# Patient Record
Sex: MALE | Race: BLACK OR AFRICAN AMERICAN | NOT HISPANIC OR LATINO | Employment: OTHER | URBAN - METROPOLITAN AREA
[De-identification: names, ages, dates, MRNs, and addresses within clinical notes are randomized per-mention and may not be internally consistent; named-entity substitution may affect disease eponyms.]

---

## 2019-08-29 ENCOUNTER — TELEPHONE (OUTPATIENT)
Dept: FAMILY MEDICINE CLINIC | Facility: CLINIC | Age: 69
End: 2019-08-29

## 2019-08-29 NOTE — TELEPHONE ENCOUNTER
S/w Willa w/ [facility] and let her know that patient needs to be seen before we can fill out paper for knee brace

## 2019-09-04 RX ORDER — CALCITRIOL 0.25 UG/1
0.25 CAPSULE, LIQUID FILLED ORAL EVERY OTHER DAY
Start: 2019-09-04

## 2019-09-04 RX ORDER — TAMSULOSIN HYDROCHLORIDE 0.4 MG/1
1 CAPSULE ORAL DAILY
Qty: 30 CAPSULE
Start: 2019-09-04 | End: 2020-03-17 | Stop reason: SDUPTHER

## 2019-09-04 RX ORDER — LISINOPRIL 40 MG/1
40 TABLET ORAL DAILY
Start: 2019-09-04 | End: 2020-03-18 | Stop reason: SDUPTHER

## 2019-09-04 RX ORDER — EPINEPHRINE 0.3 MG/.3ML
0.3 INJECTION SUBCUTANEOUS ONCE
Qty: 1 EACH | Refills: 0
Start: 2019-09-04 | End: 2019-09-04

## 2019-09-04 RX ORDER — NORTRIPTYLINE HYDROCHLORIDE 50 MG/1
100 CAPSULE ORAL NIGHTLY
Start: 2019-09-04

## 2019-09-04 RX ORDER — HYDRALAZINE HYDROCHLORIDE 100 MG/1
100 TABLET, FILM COATED ORAL 2 TIMES DAILY
Start: 2019-09-04 | End: 2020-05-29 | Stop reason: SDUPTHER

## 2019-09-04 RX ORDER — CARVEDILOL 12.5 MG/1
12.5 TABLET ORAL 2 TIMES DAILY WITH MEALS
Start: 2019-09-04 | End: 2019-12-18 | Stop reason: SDUPTHER

## 2019-09-04 RX ORDER — AMLODIPINE BESYLATE 10 MG/1
10 TABLET ORAL DAILY
Qty: 30 TABLET | Refills: 1
Start: 2019-09-04 | End: 2020-03-17 | Stop reason: SDUPTHER

## 2019-09-04 RX ORDER — PRAVASTATIN SODIUM 20 MG
20 TABLET ORAL DAILY
Start: 2019-09-04 | End: 2020-03-18 | Stop reason: SDUPTHER

## 2019-09-04 RX ORDER — TOPIRAMATE 25 MG/1
50 TABLET ORAL NIGHTLY
Start: 2019-09-04

## 2019-09-24 ENCOUNTER — OFFICE VISIT (OUTPATIENT)
Dept: FAMILY MEDICINE CLINIC | Facility: CLINIC | Age: 69
End: 2019-09-24

## 2019-09-24 VITALS
HEIGHT: 72 IN | DIASTOLIC BLOOD PRESSURE: 57 MMHG | OXYGEN SATURATION: 97 % | TEMPERATURE: 97.5 F | BODY MASS INDEX: 28.58 KG/M2 | SYSTOLIC BLOOD PRESSURE: 102 MMHG | HEART RATE: 52 BPM | WEIGHT: 211 LBS

## 2019-09-24 DIAGNOSIS — Z79.4 TYPE 2 DIABETES MELLITUS WITH DIABETIC POLYNEUROPATHY, WITH LONG-TERM CURRENT USE OF INSULIN (HCC): Primary | ICD-10-CM

## 2019-09-24 DIAGNOSIS — E11.42 TYPE 2 DIABETES MELLITUS WITH DIABETIC POLYNEUROPATHY, WITH LONG-TERM CURRENT USE OF INSULIN (HCC): Primary | ICD-10-CM

## 2019-09-24 DIAGNOSIS — N18.2 CHRONIC KIDNEY DISEASE (CKD) STAGE G2/A1, MILDLY DECREASED GLOMERULAR FILTRATION RATE (GFR) BETWEEN 60-89 ML/MIN/1.73 SQUARE METER AND ALBUMINURIA CREATININE RATIO LESS THAN 30 MG/G: ICD-10-CM

## 2019-09-24 DIAGNOSIS — E55.9 VITAMIN D DEFICIENCY: ICD-10-CM

## 2019-09-24 PROCEDURE — 99214 OFFICE O/P EST MOD 30 MIN: CPT | Performed by: FAMILY MEDICINE

## 2019-09-24 RX ORDER — HALOPERIDOL 5 MG/1
5 TABLET ORAL 2 TIMES DAILY
Refills: 0 | COMMUNITY
Start: 2019-07-18

## 2019-09-24 NOTE — PROGRESS NOTES
Chief Complaint   Patient presents with   • Diabetes   • Hypertension   • Benign Prostatic Hypertrophy   • Hyperlipidemia       Subjective   Krish Castro is a 69 y.o. male.     History of Present Illness   FU DM1 with neuropathy. BS running 150 in AM.  No lows.  On insulin regulalry.  Intolerant of metformin.    F/U HTN.  No orhtostasis.  Doing well with meds.    F/u hyperlipidemia.   No myalgias.    The following portions of the patient's history were reviewed and updated as appropriate: allergies, current medications, past family history, past medical history, past social history, past surgical history and problem list.    Review of Systems   Constitutional: Negative for appetite change and fatigue.   HENT: Negative for nosebleeds and sore throat.    Eyes: Negative for blurred vision and visual disturbance.   Respiratory: Negative for shortness of breath and wheezing.    Cardiovascular: Negative for chest pain and leg swelling.   Gastrointestinal: Negative for abdominal distention and abdominal pain.   Endocrine: Negative for cold intolerance and polyuria.   Genitourinary: Negative for dysuria and hematuria.   Musculoskeletal: Negative for arthralgias and myalgias.   Skin: Negative for color change and rash.   Neurological: Negative for weakness and confusion.   Psychiatric/Behavioral: Negative for agitation and depressed mood.       Patient Active Problem List   Diagnosis   • Seasonal allergic rhinitis due to pollen   • BPH (benign prostatic hyperplasia)   • Constipation   • Hypertension, essential   • Depression   • Diabetic peripheral neuropathy (CMS/HCC)   • Hyperlipidemia   • Type 2 diabetes mellitus with diabetic polyneuropathy, with long-term current use of insulin (CMS/[facility])   • Medicare annual wellness visit, subsequent   • Chronic kidney disease (CKD) stage G2/A1, mildly decreased glomerular filtration rate (GFR) between 60-89 mL/min/1.73 square meter and albuminuria creatinine ratio less than 30 mg/g   •  Thyroid disorder   • Vitamin D deficiency       Allergies   Allergen Reactions   • Hydrocodone Itching and Other (See Comments)     Headaches/trembles         Current Outpatient Medications:   •  amLODIPine (NORVASC) 10 MG tablet, Take 1 tablet by mouth Daily., Disp: 30 tablet, Rfl: 1  •  calcitriol (ROCALTROL) 0.25 MCG capsule, Take 1 capsule by mouth Every Other Day., Disp: , Rfl:   •  carvedilol (COREG) 12.5 MG tablet, Take 1 tablet by mouth 2 (Two) Times a Day With Meals., Disp: , Rfl:   •  haloperidol (HALDOL) 5 MG tablet, Take 5 mg by mouth 2 (Two) Times a Day., Disp: , Rfl: 0  •  hydrALAZINE (APRESOLINE) 100 MG tablet, Take 1 tablet by mouth 2 (Two) Times a Day., Disp: , Rfl:   •  insulin aspart (NOVOLOG FLEXPEN) 100 UNIT/ML solution pen-injector sc pen, Inject 4 Units under the skin into the appropriate area as directed 3 (Three) Times a Day With Meals., Disp: 5 pen, Rfl: 5  •  insulin degludec (TRESIBA FLEXTOUCH) 100 UNIT/ML solution pen-injector injection, Inject 18 Units under the skin into the appropriate area as directed Daily., Disp: , Rfl:   •  linaclotide (LINZESS) 145 MCG capsule capsule, Take 1 capsule by mouth Every Morning Before Breakfast., Disp: 30 capsule, Rfl:   •  lisinopril (PRINIVIL,ZESTRIL) 40 MG tablet, Take 1 tablet by mouth Daily., Disp: , Rfl:   •  nortriptyline (PAMELOR) 50 MG capsule, Take 2 capsules by mouth Every Night., Disp: , Rfl:   •  pravastatin (PRAVACHOL) 20 MG tablet, Take 1 tablet by mouth Daily., Disp: , Rfl:   •  tamsulosin (FLOMAX) 0.4 MG capsule 24 hr capsule, Take 1 capsule by mouth Daily., Disp: 30 capsule, Rfl:   •  glucose blood test strip, Check BS TID before meals, Disp: 300 each, Rfl: 3  •  topiramate (TOPAMAX) 25 MG tablet, Take 2 tablets by mouth Every Night., Disp: , Rfl:     Past Medical History:   Diagnosis Date   • Allergic rhinitis    • BPH (benign prostatic hyperplasia)    • Chronic kidney disease (CKD) stage G2/A1, mildly decreased glomerular  filtration rate (GFR) between 60-89 mL/min/1.73 square meter and albuminuria creatinine ratio less than 30 mg/g    • Constipation    • Constipation, chronic    • Depression    • Diabetes mellitus (CMS/HCC)    • Diabetic peripheral neuropathy (CMS/HCC)    • Former smoker    • High blood pressure    • High cholesterol    • Hyperlipidemia    • Hypertension, essential    • Inflamed skin tag    • Medicare annual wellness visit, subsequent    • Prostate disease    • Rash    • Seasonal allergic rhinitis due to pollen    • Stage 2 chronic kidney disease    • Thyroid disorder    • Tinea cruris    • Type 1 diabetes mellitus (CMS/HCC)    • Vitamin D deficiency        Past Surgical History:   Procedure Laterality Date   • COLONOSCOPY  11/2015    2 polyps   • OTHER SURGICAL HISTORY      Sialolithotomy-Abstracted from [facility]       Family History   Problem Relation Age of Onset   • Hyperlipidemia Mother    • Colon cancer Mother    • Hypertension Mother    • Anxiety disorder Mother    • Heart disease Mother    • Diabetes type I Father    • Colon cancer Brother    • Diabetes type I Brother    • Heart disease Other         In females before age 65, and males before age 55       Social History     Tobacco Use   • Smoking status: Former Smoker     Packs/day: 1.00     Years: 5.00     Pack years: 5.00     Types: Cigarettes   • Smokeless tobacco: Never Used   • Tobacco comment: smoked less than 1 pack per day for less than 5 years   Substance Use Topics   • Alcohol use: Yes     Frequency: Never     Comment: drinks alcohol, 7 or less drinks per week            Objective     Vitals:    09/24/19 1413   BP: 102/57   Pulse: 52   Temp: 97.5 °F (36.4 °C)   SpO2: 97%     Body mass index is 28.62 kg/m².    Physical Exam   Constitutional: He is oriented to person, place, and time. He appears well-developed and well-nourished.   HENT:   Head: Normocephalic and atraumatic.   Right Ear: External ear normal.   Left Ear: External ear normal.   Nose:  Nose normal.   Mouth/Throat: Oropharynx is clear and moist.   Eyes: Conjunctivae and EOM are normal. Pupils are equal, round, and reactive to light.   Neck: Normal range of motion. Neck supple. No tracheal deviation present. No thyromegaly present.   Cardiovascular: Normal rate, regular rhythm and normal heart sounds. Exam reveals no gallop and no friction rub.   No murmur heard.  Pulmonary/Chest: Effort normal and breath sounds normal. No respiratory distress. He exhibits no tenderness.   Abdominal: Soft. Bowel sounds are normal. He exhibits no distension. There is no tenderness.   Musculoskeletal: Normal range of motion. He exhibits no edema or tenderness.   Lymphadenopathy:     He has no cervical adenopathy.   Neurological: He is alert and oriented to person, place, and time. He displays normal reflexes. No cranial nerve deficit or sensory deficit. Coordination normal.   Skin: Skin is warm and dry.   Psychiatric: He has a normal mood and affect. His behavior is normal. Judgment and thought content normal.   Nursing note and vitals reviewed.      No results found for: GLUCOSE, BUN, CREATININE, EGFRIFNONA, EGFRIFAFRI, BCR, K, CO2, CALCIUM, PROTENTOTREF, ALBUMIN, LABIL2, AST, ALT    No results found for: WBC, RBC, HGB, HCT, MCV, MCH, MCHC, RDW, RDWSD, MPV, PLT, NEUTRORELPCT, LYMPHORELPCT, MONORELPCT, EOSRELPCT, BASORELPCT, AUTOIGPER, NEUTROABS, LYMPHSABS, MONOSABS, EOSABS, BASOSABS, AUTOIGNUM, NRBC    No results found for: HGBA1C    No results found for: BSVCVKEC02    No results found for: TSH    No results found for: CHOL  No results found for: TRIG  No results found for: HDL  No results found for: LDL  No results found for: VLDL  No results found for: LDLHDL      Procedures    Assessment/Plan   Problems Addressed this Visit        Digestive    Vitamin D deficiency    Relevant Orders    Vitamin D 1,25 Dihydroxy       Endocrine    Type 2 diabetes mellitus with diabetic polyneuropathy, with long-term current use of  insulin (CMS/[facility]) - Primary    Relevant Medications    insulin aspart (NOVOLOG FLEXPEN) 100 UNIT/ML solution pen-injector sc pen    Other Relevant Orders    Lipid Panel    Comprehensive Metabolic Panel    TSH Rfx On Abnormal To Free T4    Hemoglobin A1c    Vitamin D 1,25 Dihydroxy       Genitourinary    Chronic kidney disease (CKD) stage G2/A1, mildly decreased glomerular filtration rate (GFR) between 60-89 mL/min/1.73 square meter and albuminuria creatinine ratio less than 30 mg/g          Orders Placed This Encounter   Procedures   • Lipid Panel   • Comprehensive Metabolic Panel   • TSH Rfx On Abnormal To Free T4   • Hemoglobin A1c   • Vitamin D 1,25 Dihydroxy       Current Outpatient Medications   Medication Sig Dispense Refill   • amLODIPine (NORVASC) 10 MG tablet Take 1 tablet by mouth Daily. 30 tablet 1   • calcitriol (ROCALTROL) 0.25 MCG capsule Take 1 capsule by mouth Every Other Day.     • carvedilol (COREG) 12.5 MG tablet Take 1 tablet by mouth 2 (Two) Times a Day With Meals.     • haloperidol (HALDOL) 5 MG tablet Take 5 mg by mouth 2 (Two) Times a Day.  0   • hydrALAZINE (APRESOLINE) 100 MG tablet Take 1 tablet by mouth 2 (Two) Times a Day.     • insulin aspart (NOVOLOG FLEXPEN) 100 UNIT/ML solution pen-injector sc pen Inject 4 Units under the skin into the appropriate area as directed 3 (Three) Times a Day With Meals. 5 pen 5   • insulin degludec (TRESIBA FLEXTOUCH) 100 UNIT/ML solution pen-injector injection Inject 18 Units under the skin into the appropriate area as directed Daily.     • linaclotide (LINZESS) 145 MCG capsule capsule Take 1 capsule by mouth Every Morning Before Breakfast. 30 capsule    • lisinopril (PRINIVIL,ZESTRIL) 40 MG tablet Take 1 tablet by mouth Daily.     • nortriptyline (PAMELOR) 50 MG capsule Take 2 capsules by mouth Every Night.     • pravastatin (PRAVACHOL) 20 MG tablet Take 1 tablet by mouth Daily.     • tamsulosin (FLOMAX) 0.4 MG capsule 24 hr capsule Take 1 capsule by  mouth Daily. 30 capsule    • glucose blood test strip Check BS TID before meals 300 each 3   • topiramate (TOPAMAX) 25 MG tablet Take 2 tablets by mouth Every Night.       No current facility-administered medications for this visit.        Krish Castro had no medications administered during this visit.    Return in about 3 months (around 12/24/2019).    There are no Patient Instructions on file for this visit.

## 2019-09-25 LAB
1,25(OH)2D3 SERPL-MCNC: 43 PG/ML (ref 19.9–79.3)
ALBUMIN SERPL-MCNC: 3.9 G/DL (ref 3.5–5.2)
ALBUMIN/GLOB SERPL: 1.6 G/DL
ALP SERPL-CCNC: 107 U/L (ref 39–117)
ALT SERPL-CCNC: 12 U/L (ref 1–41)
AST SERPL-CCNC: 8 U/L (ref 1–40)
BILIRUB SERPL-MCNC: 0.4 MG/DL (ref 0.2–1.2)
BUN SERPL-MCNC: 13 MG/DL (ref 8–23)
BUN/CREAT SERPL: 9.4 (ref 7–25)
CALCIUM SERPL-MCNC: 8.9 MG/DL (ref 8.6–10.5)
CHLORIDE SERPL-SCNC: 105 MMOL/L (ref 98–107)
CHOLEST SERPL-MCNC: 131 MG/DL (ref 0–200)
CO2 SERPL-SCNC: 27.3 MMOL/L (ref 22–29)
CREAT SERPL-MCNC: 1.38 MG/DL (ref 0.76–1.27)
GLOBULIN SER CALC-MCNC: 2.4 GM/DL
GLUCOSE SERPL-MCNC: 79 MG/DL (ref 65–99)
HBA1C MFR BLD: 5.4 % (ref 4.8–5.6)
HDLC SERPL-MCNC: 50 MG/DL (ref 40–60)
LDLC SERPL CALC-MCNC: 64 MG/DL (ref 0–100)
POTASSIUM SERPL-SCNC: 4.6 MMOL/L (ref 3.5–5.2)
PROT SERPL-MCNC: 6.3 G/DL (ref 6–8.5)
SODIUM SERPL-SCNC: 143 MMOL/L (ref 136–145)
TRIGL SERPL-MCNC: 87 MG/DL (ref 0–150)
TSH SERPL DL<=0.005 MIU/L-ACNC: 2.64 UIU/ML (ref 0.27–4.2)
VLDLC SERPL CALC-MCNC: 17.4 MG/DL

## 2019-09-30 ENCOUNTER — TELEPHONE (OUTPATIENT)
Dept: OTHER | Facility: OTHER | Age: 69
End: 2019-09-30

## 2019-09-30 DIAGNOSIS — Z79.4 TYPE 2 DIABETES MELLITUS WITH DIABETIC POLYNEUROPATHY, WITH LONG-TERM CURRENT USE OF INSULIN (HCC): Primary | ICD-10-CM

## 2019-09-30 DIAGNOSIS — E11.42 TYPE 2 DIABETES MELLITUS WITH DIABETIC POLYNEUROPATHY, WITH LONG-TERM CURRENT USE OF INSULIN (HCC): Primary | ICD-10-CM

## 2019-09-30 NOTE — TELEPHONE ENCOUNTER
Patient states that he is needing a refill on his one touch test strips. States that his pharmacy has faxed this information over but they have not got a response. Please advise. Confirmed the pharmacy on file for patient.

## 2019-10-10 ENCOUNTER — TELEPHONE (OUTPATIENT)
Dept: FAMILY MEDICINE CLINIC | Facility: CLINIC | Age: 69
End: 2019-10-10

## 2019-10-10 DIAGNOSIS — Z79.4 TYPE 2 DIABETES MELLITUS WITH DIABETIC POLYNEUROPATHY, WITH LONG-TERM CURRENT USE OF INSULIN (HCC): ICD-10-CM

## 2019-10-10 DIAGNOSIS — E11.42 TYPE 2 DIABETES MELLITUS WITH DIABETIC POLYNEUROPATHY, WITH LONG-TERM CURRENT USE OF INSULIN (HCC): ICD-10-CM

## 2019-10-10 NOTE — TELEPHONE ENCOUNTER
PT CALLED AND REQUESTED A REFILL ON GLUCOSE TEST STRIPS. PT IS COMPLETELY OUT OF THEM.     glucose blood test strip    [id number] - [place], [place] - [address] AT IN THE [place] - [phone]  - [phone] (Phone)  [phone] (Fax)

## 2019-10-11 ENCOUNTER — TELEPHONE (OUTPATIENT)
Dept: FAMILY MEDICINE CLINIC | Facility: CLINIC | Age: 69
End: 2019-10-11

## 2019-10-11 DIAGNOSIS — E11.42 TYPE 2 DIABETES MELLITUS WITH DIABETIC POLYNEUROPATHY, WITH LONG-TERM CURRENT USE OF INSULIN (HCC): ICD-10-CM

## 2019-10-11 DIAGNOSIS — Z79.4 TYPE 2 DIABETES MELLITUS WITH DIABETIC POLYNEUROPATHY, WITH LONG-TERM CURRENT USE OF INSULIN (HCC): ICD-10-CM

## 2019-10-11 NOTE — TELEPHONE ENCOUNTER
Pharmacy needs a diagnosis code written on hard copy before they fill.    Pt is aware of this and will see if they will loan him enough strips till Monday.

## 2019-10-14 NOTE — TELEPHONE ENCOUNTER
Attempted to reach patient and let him know that I have hard copy ready to  at main office, got busy tone. This is ready to  at main office

## 2019-12-18 ENCOUNTER — OFFICE VISIT (OUTPATIENT)
Dept: FAMILY MEDICINE CLINIC | Facility: CLINIC | Age: 69
End: 2019-12-18

## 2019-12-18 VITALS
WEIGHT: 209.9 LBS | HEART RATE: 52 BPM | BODY MASS INDEX: 28.43 KG/M2 | OXYGEN SATURATION: 97 % | DIASTOLIC BLOOD PRESSURE: 66 MMHG | HEIGHT: 72 IN | TEMPERATURE: 97.2 F | SYSTOLIC BLOOD PRESSURE: 117 MMHG

## 2019-12-18 DIAGNOSIS — E78.2 MIXED HYPERLIPIDEMIA: ICD-10-CM

## 2019-12-18 DIAGNOSIS — Z00.00 MEDICARE ANNUAL WELLNESS VISIT, SUBSEQUENT: Primary | ICD-10-CM

## 2019-12-18 DIAGNOSIS — E11.42 TYPE 2 DIABETES MELLITUS WITH DIABETIC POLYNEUROPATHY, WITH LONG-TERM CURRENT USE OF INSULIN (HCC): ICD-10-CM

## 2019-12-18 DIAGNOSIS — Z79.4 TYPE 2 DIABETES MELLITUS WITH DIABETIC POLYNEUROPATHY, WITH LONG-TERM CURRENT USE OF INSULIN (HCC): ICD-10-CM

## 2019-12-18 DIAGNOSIS — I10 HYPERTENSION, ESSENTIAL: ICD-10-CM

## 2019-12-18 PROCEDURE — 99214 OFFICE O/P EST MOD 30 MIN: CPT | Performed by: FAMILY MEDICINE

## 2019-12-18 PROCEDURE — G0439 PPPS, SUBSEQ VISIT: HCPCS | Performed by: FAMILY MEDICINE

## 2019-12-18 RX ORDER — CARVEDILOL 12.5 MG/1
12.5 TABLET ORAL 2 TIMES DAILY WITH MEALS
Qty: 180 TABLET | Refills: 3 | Status: SHIPPED | OUTPATIENT
Start: 2019-12-18 | End: 2020-06-18 | Stop reason: SDUPTHER

## 2019-12-18 NOTE — PROGRESS NOTES
The ABCs of the Annual Wellness Visit  Subsequent Medicare Wellness Visit    Chief Complaint   Patient presents with   • Hypertension   • Diabetes   • congestion x 2 weeks       Subjective   History of Present Illness:  Krish Castro is a 69 y.o. male who presents for a Subsequent Medicare Wellness Visit.  F/U HTN.  Noo rthostasis.  Doing well with meds.    F?u DM2.  Needing to see a retinal specialist for retinopathy.  On insulin regularly.  BS running in - 150 in AM. A1c 5.4.  TSH   C/o trouble with cough and congestion for 2 weeks.  I am better but still with cough.  Ill contacts at times with URI symptoms.    F/U hyperlipidemia.  No myalgias.  LDL 64 9/19.     HEALTH RISK ASSESSMENT    Recent Hospitalizations:  No hospitalization(s) within the last year.    Current Medical Providers:  Patient Care Team:  Dimitris David MD as PCP - General (Family Medicine)    Smoking Status:  Social History     Tobacco Use   Smoking Status Former Smoker   • Packs/day: 1.00   • Years: 5.00   • Pack years: 5.00   • Types: Cigarettes   Smokeless Tobacco Never Used   Tobacco Comment    smoked less than 1 pack per day for less than 5 years       Alcohol Consumption:  Social History     Substance and Sexual Activity   Alcohol Use Yes   • Frequency: Never    Comment: drinks alcohol, 7 or less drinks per week       Depression Screen:   PHQ-2/PHQ-9 Depression Screening 12/18/2019   Little interest or pleasure in doing things 0   Feeling down, depressed, or hopeless 0   Total Score 0       Fall Risk Screen:  TAMIKO Fall Risk Assessment was completed, and patient is at LOW risk for falls.Assessment completed on:12/18/2019    Health Habits and Functional and Cognitive Screening:  Functional & Cognitive Status 12/18/2019   Do you have difficulty preparing food and eating? No   Do you have difficulty bathing yourself, getting dressed or grooming yourself? No   Do you have difficulty using the toilet? No   Do you have difficulty  moving around from place to place? No   Do you have trouble with steps or getting out of a bed or a chair? No   Current Diet Well Balanced Diet   Dental Exam Up to date   Eye Exam Up to date   Exercise (times per week) 2 times per week   Current Exercise Activities Include Stationary Bicycling/Spin Class   Do you need help using the phone?  No   Are you deaf or do you have serious difficulty hearing?  No   Do you need help with transportation? No   Do you need help shopping? No   Do you need help preparing meals?  No   Do you need help with housework?  No   Do you need help with laundry? No   Do you need help taking your medications? No   Do you need help managing money? No   Do you ever drive or ride in a car without wearing a seat belt? No   Have you felt unusual stress, anger or loneliness in the last month? No   Who do you live with? Alone   If you need help, do you have trouble finding someone available to you? No   Have you been bothered in the last four weeks by sexual problems? No   Do you have difficulty concentrating, remembering or making decisions? No         Does the patient have evidence of cognitive impairment? No    Asprin use counseling:Does not need ASA (and currently is not on it)    Age-appropriate Screening Schedule:  Refer to the list below for future screening recommendations based on patient's age, sex and/or medical conditions. Orders for these recommended tests are listed in the plan section. The patient has been provided with a written plan.    Health Maintenance   Topic Date Due   • URINE MICROALBUMIN  1950   • TDAP/TD VACCINES (1 - Tdap) 02/28/1960   • ZOSTER VACCINE (2 of 2) 06/20/2018   • INFLUENZA VACCINE  08/01/2019   • DIABETIC FOOT EXAM  08/29/2019   • DIABETIC EYE EXAM  08/29/2019   • HEMOGLOBIN A1C  03/24/2020   • LIPID PANEL  09/24/2020   • COLONOSCOPY  11/19/2020   • PNEUMOCOCCAL VACCINE (65+ HIGH RISK)  Completed          The following portions of the patient's history  were reviewed and updated as appropriate: allergies, current medications, past family history, past medical history, past social history, past surgical history and problem list.    Outpatient Medications Prior to Visit   Medication Sig Dispense Refill   • amLODIPine (NORVASC) 10 MG tablet Take 1 tablet by mouth Daily. 30 tablet 1   • calcitriol (ROCALTROL) 0.25 MCG capsule Take 1 capsule by mouth Every Other Day.     • glucose blood test strip Check BS TID before meals 300 each 3   • haloperidol (HALDOL) 5 MG tablet Take 5 mg by mouth 2 (Two) Times a Day.  0   • hydrALAZINE (APRESOLINE) 100 MG tablet Take 1 tablet by mouth 2 (Two) Times a Day.     • insulin aspart (NOVOLOG FLEXPEN) 100 UNIT/ML solution pen-injector sc pen Inject 4 Units under the skin into the appropriate area as directed 3 (Three) Times a Day With Meals. 5 pen 5   • insulin degludec (TRESIBA FLEXTOUCH) 100 UNIT/ML solution pen-injector injection Inject 18 Units under the skin into the appropriate area as directed Daily.     • linaclotide (LINZESS) 145 MCG capsule capsule Take 1 capsule by mouth Every Morning Before Breakfast. 30 capsule    • lisinopril (PRINIVIL,ZESTRIL) 40 MG tablet Take 1 tablet by mouth Daily.     • nortriptyline (PAMELOR) 50 MG capsule Take 2 capsules by mouth Every Night.     • pravastatin (PRAVACHOL) 20 MG tablet Take 1 tablet by mouth Daily.     • tamsulosin (FLOMAX) 0.4 MG capsule 24 hr capsule Take 1 capsule by mouth Daily. 30 capsule    • topiramate (TOPAMAX) 25 MG tablet Take 2 tablets by mouth Every Night.     • carvedilol (COREG) 12.5 MG tablet Take 1 tablet by mouth 2 (Two) Times a Day With Meals.       No facility-administered medications prior to visit.        Patient Active Problem List   Diagnosis   • Seasonal allergic rhinitis due to pollen   • BPH (benign prostatic hyperplasia)   • Constipation   • Hypertension, essential   • Depression   • Diabetic peripheral neuropathy (CMS/HCC)   • Hyperlipidemia   • Type 2  "diabetes mellitus with diabetic polyneuropathy, with long-term current use of insulin (CMS/[facility])   • Medicare annual wellness visit, subsequent   • Chronic kidney disease (CKD) stage G2/A1, mildly decreased glomerular filtration rate (GFR) between 60-89 mL/min/1.73 square meter and albuminuria creatinine ratio less than 30 mg/g   • Thyroid disorder   • Vitamin D deficiency       Advanced Care Planning:  Patient does not have an advance directive - information provided to the patient today    Review of Systems   Constitutional: Negative for activity change, appetite change and fatigue.   HENT: Negative for hearing loss and postnasal drip.    Eyes: Negative for discharge and itching.   Respiratory: Negative for cough and shortness of breath.    Cardiovascular: Negative for chest pain and leg swelling.   Gastrointestinal: Negative for abdominal distention and abdominal pain.   Endocrine: Negative for cold intolerance and heat intolerance.   Genitourinary: Negative for difficulty urinating and flank pain.   Musculoskeletal: Negative for arthralgias and myalgias.   Skin: Negative for color change.   Neurological: Negative for dizziness and facial asymmetry.   Hematological: Negative for adenopathy.   Psychiatric/Behavioral: Negative for agitation and confusion.       Compared to one year ago, the patient feels his physical health is the same.  Compared to one year ago, the patient feels his mental health is the same.    Reviewed chart for potential of high risk medication in the elderly: yes  Reviewed chart for potential of harmful drug interactions in the elderly:yes    Objective         Vitals:    12/18/19 1412   BP: 117/66   Pulse: 52   Temp: 97.2 °F (36.2 °C)   SpO2: 97%   Weight: 95.2 kg (209 lb 14.4 oz)   Height: 182.9 cm (72\")       Body mass index is 28.47 kg/m².  Discussed the patient's BMI with him. The BMI is above average; BMI management plan is completed.    Physical Exam   Constitutional: He appears " well-developed and well-nourished.   HENT:   Head: Normocephalic and atraumatic.   Right Ear: External ear normal.   Left Ear: External ear normal.   Nose: Nose normal.   Mouth/Throat: Oropharynx is clear and moist.   Eyes: Pupils are equal, round, and reactive to light. Conjunctivae and EOM are normal. Right eye exhibits no discharge. Left eye exhibits no discharge. No scleral icterus.   Neck: Normal range of motion. Neck supple. No JVD present. No tracheal deviation present. No thyromegaly present.   Cardiovascular: Normal rate, regular rhythm, normal heart sounds and intact distal pulses. Exam reveals no gallop and no friction rub.   No murmur heard.  Pulmonary/Chest: Effort normal and breath sounds normal. No respiratory distress. He has no wheezes. He has no rales. He exhibits no tenderness.   Abdominal: Soft. Bowel sounds are normal. He exhibits no distension and no mass. There is no tenderness. There is no rebound and no guarding. No hernia.   Musculoskeletal: Normal range of motion. He exhibits no edema or tenderness.   Lymphadenopathy:     He has no cervical adenopathy.   Neurological: He displays normal reflexes. No cranial nerve deficit or sensory deficit. He exhibits normal muscle tone. Coordination normal.   Skin: Skin is warm and dry.   Psychiatric: He has a normal mood and affect. His behavior is normal. Judgment and thought content normal.   Nursing note and vitals reviewed.      Lab Results   Component Value Date    GLU 79 09/24/2019    CHLPL 131 09/24/2019    TRIG 87 09/24/2019    HDL 50 09/24/2019    LDL 64 09/24/2019    VLDL 17.4 09/24/2019    HGBA1C 5.40 09/24/2019        Assessment/Plan   Medicare Risks and Personalized Health Plan  CMS Preventative Services Quick Reference  Inactivity/Sedentary    The above risks/problems have been discussed with the patient.  Pertinent information has been shared with the patient in the After Visit Summary.  Follow up plans and orders are seen below in the  Assessment/Plan Section.    Diagnoses and all orders for this visit:    1. Medicare annual wellness visit, subsequent (Primary)    2. Type 2 diabetes mellitus with diabetic polyneuropathy, with long-term current use of insulin (CMS/[facility])  Comments:  Stable.  Continue meds.      3. Mixed hyperlipidemia  Comments:  Stable.  Continue statin.      4. Hypertension, essential  Comments:  Stable.  Continue BP meds.    Orders:  -     carvedilol (COREG) 12.5 MG tablet; Take 1 tablet by mouth 2 (Two) Times a Day With Meals.  Dispense: 180 tablet; Refill: 3      Follow Up:  Return in about 3 months (around 3/18/2020).     An After Visit Summary and PPPS were given to the patient.     Get second shingrix.  i.  C scope next year.

## 2020-01-20 ENCOUNTER — TELEPHONE (OUTPATIENT)
Dept: FAMILY MEDICINE CLINIC | Facility: CLINIC | Age: 70
End: 2020-01-20

## 2020-01-20 NOTE — TELEPHONE ENCOUNTER
Left message for pt to call me back regarding paperwork I received from [facility] for diabetic supplies. Wanted to make sure he actually requested supplies from this facility as the paperwork looks like it could be a scam

## 2020-01-28 ENCOUNTER — TELEPHONE (OUTPATIENT)
Dept: FAMILY MEDICINE CLINIC | Facility: CLINIC | Age: 70
End: 2020-01-28

## 2020-03-04 ENCOUNTER — TELEPHONE (OUTPATIENT)
Dept: FAMILY MEDICINE CLINIC | Facility: CLINIC | Age: 70
End: 2020-03-04

## 2020-03-04 DIAGNOSIS — Z79.4 TYPE 2 DIABETES MELLITUS WITH DIABETIC POLYNEUROPATHY, WITH LONG-TERM CURRENT USE OF INSULIN (HCC): ICD-10-CM

## 2020-03-04 DIAGNOSIS — E11.42 TYPE 2 DIABETES MELLITUS WITH DIABETIC POLYNEUROPATHY, WITH LONG-TERM CURRENT USE OF INSULIN (HCC): ICD-10-CM

## 2020-03-04 NOTE — TELEPHONE ENCOUNTER
Patient in came in the office and needs 90 day refills sent to [facility] for the following. Please call when complete because he is out    OneTouch Ultra 50 Test strips    Novolog FlexPen prefilled syringe u-100    Tresiba Flextouch u-100

## 2020-03-09 DIAGNOSIS — E11.42 TYPE 2 DIABETES MELLITUS WITH DIABETIC POLYNEUROPATHY, WITH LONG-TERM CURRENT USE OF INSULIN (HCC): ICD-10-CM

## 2020-03-09 DIAGNOSIS — Z79.4 TYPE 2 DIABETES MELLITUS WITH DIABETIC POLYNEUROPATHY, WITH LONG-TERM CURRENT USE OF INSULIN (HCC): ICD-10-CM

## 2020-03-17 DIAGNOSIS — N40.0 BENIGN PROSTATIC HYPERPLASIA, UNSPECIFIED WHETHER LOWER URINARY TRACT SYMPTOMS PRESENT: Primary | ICD-10-CM

## 2020-03-17 DIAGNOSIS — I10 HYPERTENSION, ESSENTIAL: Primary | ICD-10-CM

## 2020-03-17 RX ORDER — TAMSULOSIN HYDROCHLORIDE 0.4 MG/1
1 CAPSULE ORAL DAILY
Qty: 90 CAPSULE | Refills: 1 | Status: SHIPPED | OUTPATIENT
Start: 2020-03-17 | End: 2020-09-22

## 2020-03-17 RX ORDER — AMLODIPINE BESYLATE 10 MG/1
10 TABLET ORAL DAILY
Qty: 90 TABLET | Refills: 1 | Status: SHIPPED | OUTPATIENT
Start: 2020-03-17 | End: 2020-09-22

## 2020-03-18 ENCOUNTER — OFFICE VISIT (OUTPATIENT)
Dept: FAMILY MEDICINE CLINIC | Facility: CLINIC | Age: 70
End: 2020-03-18

## 2020-03-18 VITALS
DIASTOLIC BLOOD PRESSURE: 68 MMHG | HEIGHT: 72 IN | BODY MASS INDEX: 28.96 KG/M2 | WEIGHT: 213.8 LBS | OXYGEN SATURATION: 99 % | SYSTOLIC BLOOD PRESSURE: 140 MMHG | HEART RATE: 53 BPM | TEMPERATURE: 97.4 F

## 2020-03-18 DIAGNOSIS — E11.42 TYPE 2 DIABETES MELLITUS WITH DIABETIC POLYNEUROPATHY, WITH LONG-TERM CURRENT USE OF INSULIN (HCC): Primary | ICD-10-CM

## 2020-03-18 DIAGNOSIS — E78.2 MIXED HYPERLIPIDEMIA: ICD-10-CM

## 2020-03-18 DIAGNOSIS — Z79.4 TYPE 2 DIABETES MELLITUS WITH DIABETIC POLYNEUROPATHY, WITH LONG-TERM CURRENT USE OF INSULIN (HCC): Primary | ICD-10-CM

## 2020-03-18 DIAGNOSIS — N40.0 BENIGN PROSTATIC HYPERPLASIA, UNSPECIFIED WHETHER LOWER URINARY TRACT SYMPTOMS PRESENT: ICD-10-CM

## 2020-03-18 DIAGNOSIS — I10 HYPERTENSION, ESSENTIAL: ICD-10-CM

## 2020-03-18 PROCEDURE — 99214 OFFICE O/P EST MOD 30 MIN: CPT | Performed by: FAMILY MEDICINE

## 2020-03-18 RX ORDER — PRAVASTATIN SODIUM 20 MG
20 TABLET ORAL DAILY
Qty: 90 TABLET | Refills: 0
Start: 2020-03-18 | End: 2020-04-22 | Stop reason: SDUPTHER

## 2020-03-18 RX ORDER — LISINOPRIL 40 MG/1
40 TABLET ORAL DAILY
Qty: 90 TABLET | Refills: 0 | Status: SHIPPED | OUTPATIENT
Start: 2020-03-18 | End: 2020-06-24 | Stop reason: SDUPTHER

## 2020-03-18 NOTE — PROGRESS NOTES
Chief Complaint   Patient presents with   • Hypertension   • Diabetes       Subjective   Krish Castro is a 70 y.o. male.     History of Present Illness   F/U HTN.  No orthostasis.  Doing well with meds.  No SE>   F/U DM2.  BS running 160-170 in AM.  On novolog 4 u with meals and 18 units with tresiba.   F/U hyperlipidemia.  No myalgias.  LDL 64 5 months ago. On pravastatin.        The following portions of the patient's history were reviewed and updated as appropriate: allergies, current medications, past family history, past medical history, past social history, past surgical history and problem list.    Review of Systems   Constitutional: Negative for appetite change and fatigue.   HENT: Negative for nosebleeds and sore throat.    Eyes: Negative for blurred vision and visual disturbance.   Respiratory: Negative for shortness of breath and wheezing.    Cardiovascular: Negative for chest pain and leg swelling.   Gastrointestinal: Negative for abdominal distention and abdominal pain.   Endocrine: Negative for cold intolerance and polyuria.   Genitourinary: Negative for dysuria and hematuria.   Musculoskeletal: Negative for arthralgias and myalgias.   Skin: Negative for color change and rash.   Neurological: Negative for weakness and confusion.   Psychiatric/Behavioral: Negative for agitation and depressed mood.       Patient Active Problem List   Diagnosis   • Seasonal allergic rhinitis due to pollen   • BPH (benign prostatic hyperplasia)   • Constipation   • Hypertension, essential   • Depression   • Diabetic peripheral neuropathy (CMS/HCC)   • Hyperlipidemia   • Type 2 diabetes mellitus with diabetic polyneuropathy, with long-term current use of insulin (CMS/[facility])   • Medicare annual wellness visit, subsequent   • Chronic kidney disease (CKD) stage G2/A1, mildly decreased glomerular filtration rate (GFR) between 60-89 mL/min/1.73 square meter and albuminuria creatinine ratio less than 30 mg/g   • Thyroid disorder    • Vitamin D deficiency       Allergies   Allergen Reactions   • Hydrocodone Itching and Other (See Comments)     Headaches/trembles         Current Outpatient Medications:   •  amLODIPine (NORVASC) 10 MG tablet, Take 1 tablet by mouth Daily., Disp: 90 tablet, Rfl: 1  •  calcitriol (ROCALTROL) 0.25 MCG capsule, Take 1 capsule by mouth Every Other Day., Disp: , Rfl:   •  carvedilol (COREG) 12.5 MG tablet, Take 1 tablet by mouth 2 (Two) Times a Day With Meals., Disp: 180 tablet, Rfl: 3  •  glucose blood test strip, Check BS TID before meals, Disp: 300 each, Rfl: 3  •  haloperidol (HALDOL) 5 MG tablet, Take 5 mg by mouth 2 (Two) Times a Day., Disp: , Rfl: 0  •  hydrALAZINE (APRESOLINE) 100 MG tablet, Take 1 tablet by mouth 2 (Two) Times a Day., Disp: , Rfl:   •  insulin aspart (NovoLOG FlexPen) 100 UNIT/ML solution pen-injector sc pen, Inject 4 Units under the skin into the appropriate area as directed 3 (Three) Times a Day With Meals., Disp: 5 pen, Rfl: 5  •  insulin degludec (Tresiba FlexTouch) 100 UNIT/ML solution pen-injector injection, Inject 18 Units under the skin into the appropriate area as directed Daily., Disp: 5 pen, Rfl: 5  •  linaclotide (LINZESS) 145 MCG capsule capsule, Take 1 capsule by mouth Every Morning Before Breakfast., Disp: 30 capsule, Rfl:   •  lisinopril (PRINIVIL,ZESTRIL) 40 MG tablet, Take 1 tablet by mouth Daily., Disp: 90 tablet, Rfl: 0  •  nortriptyline (PAMELOR) 50 MG capsule, Take 2 capsules by mouth Every Night., Disp: , Rfl:   •  pravastatin (Pravachol) 20 MG tablet, Take 1 tablet by mouth Daily., Disp: 90 tablet, Rfl: 0  •  tamsulosin (FLOMAX) 0.4 MG capsule 24 hr capsule, Take 1 capsule by mouth Daily., Disp: 90 capsule, Rfl: 1  •  topiramate (TOPAMAX) 25 MG tablet, Take 2 tablets by mouth Every Night., Disp: , Rfl:     Past Medical History:   Diagnosis Date   • Allergic rhinitis    • BPH (benign prostatic hyperplasia)    • Chronic kidney disease (CKD) stage G2/A1, mildly decreased  glomerular filtration rate (GFR) between 60-89 mL/min/1.73 square meter and albuminuria creatinine ratio less than 30 mg/g    • Constipation    • Constipation, chronic    • Depression    • Diabetes mellitus (CMS/HCC)    • Diabetic peripheral neuropathy (CMS/HCC)    • Former smoker    • High blood pressure    • High cholesterol    • Hyperlipidemia    • Hypertension, essential    • Inflamed skin tag    • Medicare annual wellness visit, subsequent    • Prostate disease    • Rash    • Seasonal allergic rhinitis due to pollen    • Stage 2 chronic kidney disease    • Thyroid disorder    • Tinea cruris    • Type 1 diabetes mellitus (CMS/HCC)    • Vitamin D deficiency        Past Surgical History:   Procedure Laterality Date   • COLONOSCOPY  11/2015    2 polyps   • OTHER SURGICAL HISTORY      Sialolithotomy-Abstracted from [facility]       Family History   Problem Relation Age of Onset   • Hyperlipidemia Mother    • Colon cancer Mother    • Hypertension Mother    • Anxiety disorder Mother    • Heart disease Mother    • Diabetes type I Father    • Colon cancer Brother    • Diabetes type I Brother    • Heart disease Other         In females before age 65, and males before age 55       Social History     Tobacco Use   • Smoking status: Former Smoker     Packs/day: 1.00     Years: 5.00     Pack years: 5.00     Types: Cigarettes   • Smokeless tobacco: Never Used   • Tobacco comment: smoked less than 1 pack per day for less than 5 years   Substance Use Topics   • Alcohol use: Yes     Frequency: Never     Comment: drinks alcohol, 7 or less drinks per week            Objective     Vitals:    03/18/20 1336   BP: 140/68   Pulse: 53   Temp: 97.4 °F (36.3 °C)   SpO2: 99%     Body mass index is 29 kg/m².    Physical Exam   Constitutional: He appears well-developed and well-nourished.   HENT:   Head: Normocephalic and atraumatic.   Mouth/Throat: Oropharynx is clear and moist.   Eyes: Pupils are equal, round, and reactive to light. No  scleral icterus.   Neck: No thyromegaly present.   Cardiovascular: Normal rate and regular rhythm. Exam reveals no gallop and no friction rub.   No murmur heard.  Pulmonary/Chest: Effort normal. No respiratory distress. He has no wheezes. He has no rales. He exhibits no tenderness.   Abdominal: Soft. Bowel sounds are normal. He exhibits no distension. There is no tenderness.   Musculoskeletal: Normal range of motion. He exhibits no edema or deformity.   Lymphadenopathy:     He has no cervical adenopathy.   Neurological: No cranial nerve deficit. He exhibits normal muscle tone.   Skin: Skin is warm and dry. No rash noted. He is not diaphoretic.   Vitals reviewed.      Lab Results   Component Value Date    BUN 13 09/24/2019    CREATININE 1.38 (H) 09/24/2019    EGFRIFNONA 51 (L) 09/24/2019    EGFRIFAFRI 62 09/24/2019    BCR 9.4 09/24/2019    K 4.6 09/24/2019    CO2 27.3 09/24/2019    CALCIUM 8.9 09/24/2019    PROTENTOTREF 6.3 09/24/2019    ALBUMIN 3.90 09/24/2019    LABIL2 1.6 09/24/2019    AST 8 09/24/2019    ALT 12 09/24/2019       No results found for: WBC, RBC, HGB, HCT, MCV, MCH, MCHC, RDW, RDWSD, MPV, PLT, NEUTRORELPCT, LYMPHORELPCT, MONORELPCT, EOSRELPCT, BASORELPCT, AUTOIGPER, NEUTROABS, LYMPHSABS, MONOSABS, EOSABS, BASOSABS, AUTOIGNUM, NRBC    Lab Results   Component Value Date    HGBA1C 5.40 09/24/2019       No results found for: [id number]    TSH   Date Value Ref Range Status   09/24/2019 2.640 0.270 - 4.200 uIU/mL Final       No results found for: CHOL  Lab Results   Component Value Date    TRIG 87 09/24/2019     Lab Results   Component Value Date    HDL 50 09/24/2019     Lab Results   Component Value Date    LDL 64 09/24/2019     Lab Results   Component Value Date    VLDL 17.4 09/24/2019     No results found for: LDLHDL      Procedures    Assessment/Plan   Problems Addressed this Visit        Cardiovascular and Mediastinum    Hypertension, essential    Hyperlipidemia       Endocrine    Type 2 diabetes  mellitus with diabetic polyneuropathy, with long-term current use of insulin (CMS/[facility]) - Primary    Relevant Medications    insulin aspart (NovoLOG FlexPen) 100 UNIT/ML solution pen-injector sc pen    insulin degludec (Tresiba FlexTouch) 100 UNIT/ML solution pen-injector injection    Other Relevant Orders    Comprehensive Metabolic Panel    Hemoglobin A1c       Genitourinary    BPH (benign prostatic hyperplasia)    Relevant Orders    PSA DIAGNOSTIC      HTN.  Controlled.   Continue meds.    LDL at goal.  Continue meds.       Orders Placed This Encounter   Procedures   • Comprehensive Metabolic Panel   • Hemoglobin A1c   • PSA DIAGNOSTIC       Current Outpatient Medications   Medication Sig Dispense Refill   • amLODIPine (NORVASC) 10 MG tablet Take 1 tablet by mouth Daily. 90 tablet 1   • calcitriol (ROCALTROL) 0.25 MCG capsule Take 1 capsule by mouth Every Other Day.     • carvedilol (COREG) 12.5 MG tablet Take 1 tablet by mouth 2 (Two) Times a Day With Meals. 180 tablet 3   • glucose blood test strip Check BS TID before meals 300 each 3   • haloperidol (HALDOL) 5 MG tablet Take 5 mg by mouth 2 (Two) Times a Day.  0   • hydrALAZINE (APRESOLINE) 100 MG tablet Take 1 tablet by mouth 2 (Two) Times a Day.     • insulin aspart (NovoLOG FlexPen) 100 UNIT/ML solution pen-injector sc pen Inject 4 Units under the skin into the appropriate area as directed 3 (Three) Times a Day With Meals. 5 pen 5   • insulin degludec (Tresiba FlexTouch) 100 UNIT/ML solution pen-injector injection Inject 18 Units under the skin into the appropriate area as directed Daily. 5 pen 5   • linaclotide (LINZESS) 145 MCG capsule capsule Take 1 capsule by mouth Every Morning Before Breakfast. 30 capsule    • lisinopril (PRINIVIL,ZESTRIL) 40 MG tablet Take 1 tablet by mouth Daily. 90 tablet 0   • nortriptyline (PAMELOR) 50 MG capsule Take 2 capsules by mouth Every Night.     • pravastatin (Pravachol) 20 MG tablet Take 1 tablet by mouth Daily. 90 tablet  0   • tamsulosin (FLOMAX) 0.4 MG capsule 24 hr capsule Take 1 capsule by mouth Daily. 90 capsule 1   • topiramate (TOPAMAX) 25 MG tablet Take 2 tablets by mouth Every Night.       No current facility-administered medications for this visit.        Krish Castro had no medications administered during this visit.    Return in about 3 months (around 6/18/2020).    There are no Patient Instructions on file for this visit.

## 2020-03-19 LAB
ALBUMIN SERPL-MCNC: 3.9 G/DL (ref 3.5–5.2)
ALBUMIN/GLOB SERPL: 1.6 G/DL
ALP SERPL-CCNC: 114 U/L (ref 39–117)
ALT SERPL-CCNC: 19 U/L (ref 1–41)
AST SERPL-CCNC: 7 U/L (ref 1–40)
BILIRUB SERPL-MCNC: 0.4 MG/DL (ref 0.2–1.2)
BUN SERPL-MCNC: 22 MG/DL (ref 8–23)
BUN/CREAT SERPL: 12.9 (ref 7–25)
CALCIUM SERPL-MCNC: 9.2 MG/DL (ref 8.6–10.5)
CHLORIDE SERPL-SCNC: 102 MMOL/L (ref 98–107)
CO2 SERPL-SCNC: 27.9 MMOL/L (ref 22–29)
CREAT SERPL-MCNC: 1.7 MG/DL (ref 0.76–1.27)
GLOBULIN SER CALC-MCNC: 2.5 GM/DL
GLUCOSE SERPL-MCNC: 118 MG/DL (ref 65–99)
HBA1C MFR BLD: 6 % (ref 4.8–5.6)
POTASSIUM SERPL-SCNC: 4.7 MMOL/L (ref 3.5–5.2)
PROT SERPL-MCNC: 6.4 G/DL (ref 6–8.5)
PSA SERPL-MCNC: 3.16 NG/ML (ref 0–4)
SODIUM SERPL-SCNC: 139 MMOL/L (ref 136–145)

## 2020-04-22 RX ORDER — PRAVASTATIN SODIUM 20 MG
20 TABLET ORAL DAILY
Qty: 90 TABLET | Refills: 1 | Status: SHIPPED | OUTPATIENT
Start: 2020-04-22 | End: 2020-09-24 | Stop reason: SDUPTHER

## 2020-05-29 RX ORDER — HYDRALAZINE HYDROCHLORIDE 100 MG/1
100 TABLET, FILM COATED ORAL 2 TIMES DAILY
Qty: 180 TABLET | Refills: 3 | Status: SHIPPED | OUTPATIENT
Start: 2020-05-29 | End: 2022-05-04 | Stop reason: SDUPTHER

## 2020-06-18 ENCOUNTER — OFFICE VISIT (OUTPATIENT)
Dept: FAMILY MEDICINE CLINIC | Facility: CLINIC | Age: 70
End: 2020-06-18

## 2020-06-18 VITALS
DIASTOLIC BLOOD PRESSURE: 56 MMHG | SYSTOLIC BLOOD PRESSURE: 100 MMHG | HEART RATE: 54 BPM | OXYGEN SATURATION: 95 % | BODY MASS INDEX: 29.15 KG/M2 | HEIGHT: 72 IN | TEMPERATURE: 97.8 F | WEIGHT: 215.2 LBS

## 2020-06-18 DIAGNOSIS — Z79.4 TYPE 2 DIABETES MELLITUS WITH DIABETIC POLYNEUROPATHY, WITH LONG-TERM CURRENT USE OF INSULIN (HCC): Primary | ICD-10-CM

## 2020-06-18 DIAGNOSIS — I10 HYPERTENSION, ESSENTIAL: ICD-10-CM

## 2020-06-18 DIAGNOSIS — E78.2 MIXED HYPERLIPIDEMIA: ICD-10-CM

## 2020-06-18 DIAGNOSIS — E11.42 TYPE 2 DIABETES MELLITUS WITH DIABETIC POLYNEUROPATHY, WITH LONG-TERM CURRENT USE OF INSULIN (HCC): Primary | ICD-10-CM

## 2020-06-18 LAB — HBA1C MFR BLD: 6 %

## 2020-06-18 PROCEDURE — 83036 HEMOGLOBIN GLYCOSYLATED A1C: CPT | Performed by: FAMILY MEDICINE

## 2020-06-18 PROCEDURE — 99214 OFFICE O/P EST MOD 30 MIN: CPT | Performed by: FAMILY MEDICINE

## 2020-06-18 RX ORDER — CARVEDILOL 12.5 MG/1
12.5 TABLET ORAL 2 TIMES DAILY WITH MEALS
Qty: 180 TABLET | Refills: 3 | Status: SHIPPED | OUTPATIENT
Start: 2020-06-18 | End: 2020-12-16

## 2020-06-18 NOTE — PROGRESS NOTES
Chief Complaint   Patient presents with   • Diabetes   • Hypertension   • Hyperlipidemia       Jose Ramon Castro is a 70 y.o. male.     History of Present Illness   F/U DM2.  On tresiba 18 units a day and novolog 4 units with meals. No lows.  BS running 150-175 usually.    F/U HTN.  No orthostasis.   FU hyperlipidemia.  LDL 64 8 months ago.  On statin.       The following portions of the patient's history were reviewed and updated as appropriate: allergies, current medications, past family history, past medical history, past social history, past surgical history and problem list.    Review of Systems   Constitutional: Negative for appetite change and fatigue.   HENT: Negative for nosebleeds and sore throat.    Eyes: Negative for blurred vision and visual disturbance.   Respiratory: Negative for shortness of breath and wheezing.    Cardiovascular: Negative for chest pain and leg swelling.   Gastrointestinal: Negative for abdominal distention and abdominal pain.   Endocrine: Negative for cold intolerance and polyuria.   Genitourinary: Negative for dysuria and hematuria.   Musculoskeletal: Negative for arthralgias and myalgias.   Skin: Negative for color change and rash.   Neurological: Negative for weakness and confusion.   Psychiatric/Behavioral: Negative for agitation and depressed mood.       Patient Active Problem List   Diagnosis   • Seasonal allergic rhinitis due to pollen   • BPH (benign prostatic hyperplasia)   • Constipation   • Hypertension, essential   • Depression   • Diabetic peripheral neuropathy (CMS/[facility])   • Hyperlipidemia   • Type 2 diabetes mellitus with diabetic polyneuropathy, with long-term current use of insulin (CMS/[facility])   • Medicare annual wellness visit, subsequent   • Chronic kidney disease (CKD) stage G2/A1, mildly decreased glomerular filtration rate (GFR) between 60-89 mL/min/1.73 square meter and albuminuria creatinine ratio less than 30 mg/g   • Thyroid disorder   • Vitamin D  deficiency       Allergies   Allergen Reactions   • Hydrocodone Itching and Other (See Comments)     Headaches/trembles         Current Outpatient Medications:   •  amLODIPine (NORVASC) 10 MG tablet, Take 1 tablet by mouth Daily., Disp: 90 tablet, Rfl: 1  •  calcitriol (ROCALTROL) 0.25 MCG capsule, Take 1 capsule by mouth Every Other Day., Disp: , Rfl:   •  carvedilol (Coreg) 12.5 MG tablet, Take 1 tablet by mouth 2 (Two) Times a Day With Meals., Disp: 180 tablet, Rfl: 3  •  glucose blood test strip, Check BS TID before meals, Disp: 300 each, Rfl: 3  •  haloperidol (HALDOL) 5 MG tablet, Take 5 mg by mouth 2 (Two) Times a Day., Disp: , Rfl: 0  •  hydrALAZINE (APRESOLINE) 100 MG tablet, Take 1 tablet by mouth 2 (Two) Times a Day., Disp: 180 tablet, Rfl: 3  •  insulin aspart (NovoLOG FlexPen) 100 UNIT/ML solution pen-injector sc pen, Inject 4 Units under the skin into the appropriate area as directed 3 (Three) Times a Day With Meals., Disp: 5 pen, Rfl: 5  •  insulin degludec (Tresiba FlexTouch) 100 UNIT/ML solution pen-injector injection, Inject 18 Units under the skin into the appropriate area as directed Daily., Disp: 5 pen, Rfl: 5  •  linaclotide (LINZESS) 145 MCG capsule capsule, Take 1 capsule by mouth Every Morning Before Breakfast., Disp: 30 capsule, Rfl:   •  lisinopril (PRINIVIL,ZESTRIL) 40 MG tablet, Take 1 tablet by mouth Daily., Disp: 90 tablet, Rfl: 0  •  nortriptyline (PAMELOR) 50 MG capsule, Take 2 capsules by mouth Every Night., Disp: , Rfl:   •  pravastatin (Pravachol) 20 MG tablet, Take 1 tablet by mouth Daily., Disp: 90 tablet, Rfl: 1  •  tamsulosin (FLOMAX) 0.4 MG capsule 24 hr capsule, Take 1 capsule by mouth Daily., Disp: 90 capsule, Rfl: 1  •  topiramate (TOPAMAX) 25 MG tablet, Take 2 tablets by mouth Every Night., Disp: , Rfl:     Past Medical History:   Diagnosis Date   • Allergic rhinitis    • BPH (benign prostatic hyperplasia)    • Chronic kidney disease (CKD) stage G2/A1, mildly decreased  glomerular filtration rate (GFR) between 60-89 mL/min/1.73 square meter and albuminuria creatinine ratio less than 30 mg/g    • Constipation    • Constipation, chronic    • Depression    • Diabetes mellitus (CMS/HCC)    • Diabetic peripheral neuropathy (CMS/HCC)    • Former smoker    • High blood pressure    • High cholesterol    • Hyperlipidemia    • Hypertension, essential    • Inflamed skin tag    • Medicare annual wellness visit, subsequent    • Prostate disease    • Rash    • Seasonal allergic rhinitis due to pollen    • Stage 2 chronic kidney disease    • Thyroid disorder    • Tinea cruris    • Type 1 diabetes mellitus (CMS/HCC)    • Vitamin D deficiency        Past Surgical History:   Procedure Laterality Date   • COLONOSCOPY  11/2015    2 polyps   • OTHER SURGICAL HISTORY      Sialolithotomy-Abstracted from [facility]       Family History   Problem Relation Age of Onset   • Hyperlipidemia Mother    • Colon cancer Mother    • Hypertension Mother    • Anxiety disorder Mother    • Heart disease Mother    • Diabetes type I Father    • Colon cancer Brother    • Diabetes type I Brother    • Heart disease Other         In females before age 65, and males before age 55       Social History     Tobacco Use   • Smoking status: Former Smoker     Packs/day: 1.00     Years: 5.00     Pack years: 5.00     Types: Cigarettes   • Smokeless tobacco: Never Used   • Tobacco comment: smoked less than 1 pack per day for less than 5 years   Substance Use Topics   • Alcohol use: Yes     Frequency: Never     Comment: drinks alcohol, 7 or less drinks per week            Objective     Vitals:    06/18/20 1330   BP: 100/56   Pulse: 54   Temp: 97.8 °F (36.6 °C)   SpO2: 95%     Body mass index is 29.19 kg/m².    Physical Exam   Constitutional: He appears well-developed and well-nourished.   HENT:   Head: Normocephalic and atraumatic.   Mouth/Throat: Oropharynx is clear and moist.   Eyes: Pupils are equal, round, and reactive to light. No  scleral icterus.   Neck: No thyromegaly present.   Cardiovascular: Normal rate and regular rhythm. Exam reveals no gallop and no friction rub.   No murmur heard.  Pulmonary/Chest: Effort normal. No respiratory distress. He has no wheezes. He has no rales. He exhibits no tenderness.   Abdominal: Soft. Bowel sounds are normal. He exhibits no distension. There is no tenderness.   Musculoskeletal: Normal range of motion. He exhibits no edema or deformity.   Lymphadenopathy:     He has no cervical adenopathy.   Neurological: No cranial nerve deficit. He exhibits normal muscle tone.   Skin: Skin is warm and dry. No rash noted. He is not diaphoretic.   Vitals reviewed.      Lab Results   Component Value Date    BUN 22 03/18/2020    CREATININE 1.70 (H) 03/18/2020    EGFRIFNONA 40 (L) 03/18/2020    EGFRIFAFRI 48 (L) 03/18/2020    BCR 12.9 03/18/2020    K 4.7 03/18/2020    CO2 27.9 03/18/2020    CALCIUM 9.2 03/18/2020    PROTENTOTREF 6.4 03/18/2020    ALBUMIN 3.90 03/18/2020    LABIL2 1.6 03/18/2020    AST 7 03/18/2020    ALT 19 03/18/2020       No results found for: WBC, RBC, HGB, HCT, MCV, MCH, MCHC, RDW, RDWSD, MPV, PLT, NEUTRORELPCT, LYMPHORELPCT, MONORELPCT, EOSRELPCT, BASORELPCT, AUTOIGPER, NEUTROABS, LYMPHSABS, MONOSABS, EOSABS, BASOSABS, AUTOIGNUM, NRBC    Lab Results   Component Value Date    HGBA1C 6.00 (H) 03/18/2020       No results found for: [id number]    TSH   Date Value Ref Range Status   09/24/2019 2.640 0.270 - 4.200 uIU/mL Final       No results found for: CHOL  Lab Results   Component Value Date    TRIG 87 09/24/2019     Lab Results   Component Value Date    HDL 50 09/24/2019     Lab Results   Component Value Date    LDL 64 09/24/2019     Lab Results   Component Value Date    VLDL 17.4 09/24/2019     No results found for: LDLHDL      Procedures    Assessment/Plan   Problems Addressed this Visit        Cardiovascular and Mediastinum    Hypertension, essential    Relevant Medications    carvedilol (Coreg)  12.5 MG tablet    Hyperlipidemia       Endocrine    Type 2 diabetes mellitus with diabetic polyneuropathy, with long-term current use of insulin (CMS/[facility]) - Primary    Relevant Orders    POC Glycosylated Hemoglobin (Hb A1C)      Continue statin.   Continue insulin tx.    Novolog samples given.     Orders Placed This Encounter   Procedures   • POC Glycosylated Hemoglobin (Hb A1C)       Current Outpatient Medications   Medication Sig Dispense Refill   • amLODIPine (NORVASC) 10 MG tablet Take 1 tablet by mouth Daily. 90 tablet 1   • calcitriol (ROCALTROL) 0.25 MCG capsule Take 1 capsule by mouth Every Other Day.     • carvedilol (Coreg) 12.5 MG tablet Take 1 tablet by mouth 2 (Two) Times a Day With Meals. 180 tablet 3   • glucose blood test strip Check BS TID before meals 300 each 3   • haloperidol (HALDOL) 5 MG tablet Take 5 mg by mouth 2 (Two) Times a Day.  0   • hydrALAZINE (APRESOLINE) 100 MG tablet Take 1 tablet by mouth 2 (Two) Times a Day. 180 tablet 3   • insulin aspart (NovoLOG FlexPen) 100 UNIT/ML solution pen-injector sc pen Inject 4 Units under the skin into the appropriate area as directed 3 (Three) Times a Day With Meals. 5 pen 5   • insulin degludec (Tresiba FlexTouch) 100 UNIT/ML solution pen-injector injection Inject 18 Units under the skin into the appropriate area as directed Daily. 5 pen 5   • linaclotide (LINZESS) 145 MCG capsule capsule Take 1 capsule by mouth Every Morning Before Breakfast. 30 capsule    • lisinopril (PRINIVIL,ZESTRIL) 40 MG tablet Take 1 tablet by mouth Daily. 90 tablet 0   • nortriptyline (PAMELOR) 50 MG capsule Take 2 capsules by mouth Every Night.     • pravastatin (Pravachol) 20 MG tablet Take 1 tablet by mouth Daily. 90 tablet 1   • tamsulosin (FLOMAX) 0.4 MG capsule 24 hr capsule Take 1 capsule by mouth Daily. 90 capsule 1   • topiramate (TOPAMAX) 25 MG tablet Take 2 tablets by mouth Every Night.       No current facility-administered medications for this visit.         Krish Castro had no medications administered during this visit.    No follow-ups on file.    There are no Patient Instructions on file for this visit.

## 2020-06-24 ENCOUNTER — TELEPHONE (OUTPATIENT)
Dept: FAMILY MEDICINE CLINIC | Facility: CLINIC | Age: 70
End: 2020-06-24

## 2020-06-24 DIAGNOSIS — I10 HYPERTENSION, ESSENTIAL: Primary | ICD-10-CM

## 2020-06-24 RX ORDER — LISINOPRIL 40 MG/1
40 TABLET ORAL DAILY
Qty: 90 TABLET | Refills: 2 | Status: SHIPPED | OUTPATIENT
Start: 2020-06-24 | End: 2020-12-16 | Stop reason: SDUPTHER

## 2020-06-24 NOTE — TELEPHONE ENCOUNTER
Patient needs the below medication filled      lisinopril (PRINIVIL,ZESTRIL) 40 MG tablet [[id number]]     Order Details   Dose: 40 mg Route: Oral Frequency: Daily   Dispense Quantity: 90 tablet Refills: 0 Fills remaining: --           Sig: Take 1 tablet by mouth Daily.          Written Date: 03/18/20 Expiration Date: 03/18/21     Start Date: 03/18/20 End Date: --

## 2020-09-22 DIAGNOSIS — I10 HYPERTENSION, ESSENTIAL: ICD-10-CM

## 2020-09-22 DIAGNOSIS — N40.0 BENIGN PROSTATIC HYPERPLASIA, UNSPECIFIED WHETHER LOWER URINARY TRACT SYMPTOMS PRESENT: ICD-10-CM

## 2020-09-22 RX ORDER — TAMSULOSIN HYDROCHLORIDE 0.4 MG/1
CAPSULE ORAL
Qty: 90 CAPSULE | Refills: 1 | Status: SHIPPED | OUTPATIENT
Start: 2020-09-22 | End: 2021-03-16

## 2020-09-22 RX ORDER — AMLODIPINE BESYLATE 10 MG/1
TABLET ORAL
Qty: 90 TABLET | Refills: 1 | Status: SHIPPED | OUTPATIENT
Start: 2020-09-22 | End: 2021-03-16

## 2020-09-24 ENCOUNTER — OFFICE VISIT (OUTPATIENT)
Dept: FAMILY MEDICINE CLINIC | Facility: CLINIC | Age: 70
End: 2020-09-24

## 2020-09-24 VITALS
DIASTOLIC BLOOD PRESSURE: 70 MMHG | RESPIRATION RATE: 18 BRPM | BODY MASS INDEX: 31.37 KG/M2 | OXYGEN SATURATION: 98 % | HEART RATE: 52 BPM | HEIGHT: 72 IN | TEMPERATURE: 98 F | SYSTOLIC BLOOD PRESSURE: 138 MMHG | WEIGHT: 231.6 LBS

## 2020-09-24 DIAGNOSIS — Z79.4 TYPE 2 DIABETES MELLITUS WITH DIABETIC POLYNEUROPATHY, WITH LONG-TERM CURRENT USE OF INSULIN (HCC): ICD-10-CM

## 2020-09-24 DIAGNOSIS — Z23 NEED FOR INFLUENZA VACCINATION: Primary | ICD-10-CM

## 2020-09-24 DIAGNOSIS — E78.2 MIXED HYPERLIPIDEMIA: ICD-10-CM

## 2020-09-24 DIAGNOSIS — I10 HYPERTENSION, ESSENTIAL: ICD-10-CM

## 2020-09-24 DIAGNOSIS — N18.2 CHRONIC KIDNEY DISEASE (CKD) STAGE G2/A1, MILDLY DECREASED GLOMERULAR FILTRATION RATE (GFR) BETWEEN 60-89 ML/MIN/1.73 SQUARE METER AND ALBUMINURIA CREATININE RATIO LESS THAN 30 MG/G: ICD-10-CM

## 2020-09-24 DIAGNOSIS — E11.42 TYPE 2 DIABETES MELLITUS WITH DIABETIC POLYNEUROPATHY, WITH LONG-TERM CURRENT USE OF INSULIN (HCC): ICD-10-CM

## 2020-09-24 PROCEDURE — 90686 IIV4 VACC NO PRSV 0.5 ML IM: CPT | Performed by: FAMILY MEDICINE

## 2020-09-24 PROCEDURE — 99214 OFFICE O/P EST MOD 30 MIN: CPT | Performed by: FAMILY MEDICINE

## 2020-09-24 PROCEDURE — G0008 ADMIN INFLUENZA VIRUS VAC: HCPCS | Performed by: FAMILY MEDICINE

## 2020-09-24 RX ORDER — PRAVASTATIN SODIUM 20 MG
20 TABLET ORAL DAILY
Qty: 90 TABLET | Refills: 1
Start: 2020-09-24 | End: 2020-10-15

## 2020-09-24 NOTE — PROGRESS NOTES
Chief Complaint   Patient presents with   • Diabetes     3 month fup med eval refills hx diabetes htn elevated cholesterol   • Hypertension   • Hyperlipidemia       Jose Ramon Castro is a 70 y.o. male.     History of Present Illness   F/u DM2.  BS running 150 in AM.  Doing well with meds.  On insulin regulalry.  A1c 6.0 6 months ago.    F/U HTN c CKD.  No orhtostasis.  Doing well with meds.  No edema.    F/U hyperlipidemia.  No myalgias.  Doing well with meds.    The following portions of the patient's history were reviewed and updated as appropriate: allergies, current medications, past family history, past medical history, past social history, past surgical history and problem list.    Review of Systems   Constitutional: Negative for appetite change and fatigue.   HENT: Negative for nosebleeds and sore throat.    Eyes: Negative for blurred vision and visual disturbance.   Respiratory: Negative for shortness of breath and wheezing.    Cardiovascular: Negative for chest pain and leg swelling.   Gastrointestinal: Negative for abdominal distention and abdominal pain.   Endocrine: Negative for cold intolerance and polyuria.   Genitourinary: Negative for dysuria and hematuria.   Musculoskeletal: Negative for arthralgias and myalgias.   Skin: Negative for color change and rash.   Neurological: Negative for weakness and confusion.   Psychiatric/Behavioral: Negative for agitation and depressed mood.       Patient Active Problem List   Diagnosis   • Seasonal allergic rhinitis due to pollen   • BPH (benign prostatic hyperplasia)   • Constipation   • Hypertension, essential   • Depression   • Diabetic peripheral neuropathy (CMS/HCC)   • Hyperlipidemia   • Type 2 diabetes mellitus with diabetic polyneuropathy, with long-term current use of insulin (CMS/[facility])   • Medicare annual wellness visit, subsequent   • Chronic kidney disease (CKD) stage G2/A1, mildly decreased glomerular filtration rate (GFR) between 60-89  mL/min/1.73 square meter and albuminuria creatinine ratio less than 30 mg/g   • Thyroid disorder   • Vitamin D deficiency       Allergies   Allergen Reactions   • Hydrocodone Itching and Other (See Comments)     Headaches/trembles         Current Outpatient Medications:   •  amLODIPine (NORVASC) 10 MG tablet, TAKE 1 TABLET BY MOUTH EVERY DAY, Disp: 90 tablet, Rfl: 1  •  calcitriol (ROCALTROL) 0.25 MCG capsule, Take 1 capsule by mouth Every Other Day., Disp: , Rfl:   •  carvedilol (Coreg) 12.5 MG tablet, Take 1 tablet by mouth 2 (Two) Times a Day With Meals., Disp: 180 tablet, Rfl: 3  •  glucose blood test strip, Check BS TID before meals, Disp: 300 each, Rfl: 3  •  haloperidol (HALDOL) 5 MG tablet, Take 5 mg by mouth 2 (Two) Times a Day., Disp: , Rfl: 0  •  hydrALAZINE (APRESOLINE) 100 MG tablet, Take 1 tablet by mouth 2 (Two) Times a Day., Disp: 180 tablet, Rfl: 3  •  insulin aspart (NovoLOG FlexPen) 100 UNIT/ML solution pen-injector sc pen, Inject 4 Units under the skin into the appropriate area as directed 3 (Three) Times a Day With Meals., Disp: 5 pen, Rfl: 5  •  insulin degludec (Tresiba FlexTouch) 100 UNIT/ML solution pen-injector injection, Inject 18 Units under the skin into the appropriate area as directed Daily., Disp: 5 pen, Rfl: 5  •  lisinopril (PRINIVIL,ZESTRIL) 40 MG tablet, Take 1 tablet by mouth Daily., Disp: 90 tablet, Rfl: 2  •  nortriptyline (PAMELOR) 50 MG capsule, Take 2 capsules by mouth Every Night., Disp: , Rfl:   •  pravastatin (Pravachol) 20 MG tablet, Take 1 tablet by mouth Daily., Disp: 90 tablet, Rfl: 1  •  tamsulosin (FLOMAX) 0.4 MG capsule 24 hr capsule, TAKE 1 CAPSULE BY MOUTH EVERY DAY, Disp: 90 capsule, Rfl: 1  •  topiramate (TOPAMAX) 25 MG tablet, Take 2 tablets by mouth Every Night., Disp: , Rfl:   •  linaclotide (LINZESS) 145 MCG capsule capsule, Take 1 capsule by mouth Every Morning Before Breakfast., Disp: 30 capsule, Rfl:     Past Medical History:   Diagnosis Date   •  Allergic rhinitis    • BPH (benign prostatic hyperplasia)    • Chronic kidney disease (CKD) stage G2/A1, mildly decreased glomerular filtration rate (GFR) between 60-89 mL/min/1.73 square meter and albuminuria creatinine ratio less than 30 mg/g    • Constipation    • Constipation, chronic    • Depression    • Diabetes mellitus (CMS/HCC)    • Diabetic peripheral neuropathy (CMS/HCC)    • Former smoker    • High blood pressure    • High cholesterol    • Hyperlipidemia    • Hypertension, essential    • Inflamed skin tag    • Medicare annual wellness visit, subsequent    • Prostate disease    • Rash    • Seasonal allergic rhinitis due to pollen    • Stage 2 chronic kidney disease    • Thyroid disorder    • Tinea cruris    • Type 1 diabetes mellitus (CMS/HCC)    • Vitamin D deficiency        Past Surgical History:   Procedure Laterality Date   • COLONOSCOPY  11/2015    2 polyps   • OTHER SURGICAL HISTORY      Sialolithotomy-Abstracted from [email or web address]       Family History   Problem Relation Age of Onset   • Hyperlipidemia Mother    • Colon cancer Mother    • Hypertension Mother    • Anxiety disorder Mother    • Heart disease Mother    • Diabetes type I Father    • Colon cancer Brother    • Diabetes type I Brother    • Heart disease Other         In females before age 65, and males before age 55       Social History     Tobacco Use   • Smoking status: Former Smoker     Packs/day: 1.00     Years: 5.00     Pack years: 5.00     Types: Cigarettes   • Smokeless tobacco: Never Used   • Tobacco comment: smoked less than 1 pack per day for less than 5 years   Substance Use Topics   • Alcohol use: Yes     Frequency: Never     Comment: drinks alcohol, 7 or less drinks per week            Objective     Vitals:    09/24/20 1243   BP: 138/70   Pulse: 52   Resp: 18   Temp: 98 °F (36.7 °C)   SpO2: 98%     Body mass index is 31.41 kg/m².    Physical Exam  Vitals signs reviewed.   Constitutional:       Appearance: He is well-developed. He  is not diaphoretic.   HENT:      Head: Normocephalic and atraumatic.   Eyes:      General: No scleral icterus.     Pupils: Pupils are equal, round, and reactive to light.   Neck:      Thyroid: No thyromegaly.   Cardiovascular:      Rate and Rhythm: Normal rate and regular rhythm.      Heart sounds: No murmur. No friction rub. No gallop.    Pulmonary:      Effort: Pulmonary effort is normal. No respiratory distress.      Breath sounds: No wheezing or rales.   Chest:      Chest wall: No tenderness.   Abdominal:      General: Bowel sounds are normal. There is no distension.      Palpations: Abdomen is soft.      Tenderness: There is no abdominal tenderness.   Musculoskeletal: Normal range of motion.         General: No deformity.   Lymphadenopathy:      Cervical: No cervical adenopathy.   Skin:     General: Skin is warm and dry.      Findings: No rash.   Neurological:      Cranial Nerves: No cranial nerve deficit.      Motor: No abnormal muscle tone.         Lab Results   Component Value Date    BUN 22 03/18/2020    CREATININE 1.70 (H) 03/18/2020    EGFRIFNONA 40 (L) 03/18/2020    EGFRIFAFRI 48 (L) 03/18/2020    BCR 12.9 03/18/2020    K 4.7 03/18/2020    CO2 27.9 03/18/2020    CALCIUM 9.2 03/18/2020    PROTENTOTREF 6.4 03/18/2020    ALBUMIN 3.90 03/18/2020    LABIL2 1.6 03/18/2020    AST 7 03/18/2020    ALT 19 03/18/2020       No results found for: WBC, RBC, HGB, HCT, MCV, MCH, MCHC, RDW, RDWSD, MPV, PLT, NEUTRORELPCT, LYMPHORELPCT, MONORELPCT, EOSRELPCT, BASORELPCT, AUTOIGPER, NEUTROABS, LYMPHSABS, MONOSABS, EOSABS, BASOSABS, AUTOIGNUM, NRBC    Lab Results   Component Value Date    HGBA1C 6.0 06/18/2020       No results found for: [id number]    TSH   Date Value Ref Range Status   09/24/2019 2.640 0.270 - 4.200 uIU/mL Final       No results found for: CHOL  Lab Results   Component Value Date    TRIG 87 09/24/2019     Lab Results   Component Value Date    HDL 50 09/24/2019     Lab Results   Component Value Date    LDL  64 09/24/2019     Lab Results   Component Value Date    VLDL 17.4 09/24/2019     No results found for: LDLHDL      Procedures    Assessment/Plan   Problems Addressed this Visit        Cardiovascular and Mediastinum    Hypertension, essential    Relevant Orders    Comprehensive Metabolic Panel    Hyperlipidemia    Relevant Medications    pravastatin (Pravachol) 20 MG tablet    Other Relevant Orders    Comprehensive Metabolic Panel    Lipid Panel With / Chol / HDL Ratio       Endocrine    Type 2 diabetes mellitus with diabetic polyneuropathy, with long-term current use of insulin (CMS/[facility])    Relevant Orders    Comprehensive Metabolic Panel    Hemoglobin A1c    Lipid Panel With / Chol / HDL Ratio       Genitourinary    Chronic kidney disease (CKD) stage G2/A1, mildly decreased glomerular filtration rate (GFR) between 60-89 mL/min/1.73 square meter and albuminuria creatinine ratio less than 30 mg/g    Relevant Orders    Comprehensive Metabolic Panel      Other Visit Diagnoses     Need for influenza vaccination    -  Primary    Relevant Orders    FluLaval Quad >6 Months (1313-3160) (Completed)      Diagnoses       Codes Comments    Need for influenza vaccination    -  Primary ICD-10-CM: Z23  ICD-9-CM: V04.81     Type 2 diabetes mellitus with diabetic polyneuropathy, with long-term current use of insulin (CMS/[facility])     ICD-10-CM: E11.42, Z79.4  ICD-9-CM: 250.60, 357.2, V58.67     Chronic kidney disease (CKD) stage G2/A1, mildly decreased glomerular filtration rate (GFR) between 60-89 mL/min/1.73 square meter and albuminuria creatinine ratio less than 30 mg/g     ICD-10-CM: N18.2  ICD-9-CM: 585.2     Hypertension, essential     ICD-10-CM: I10  ICD-9-CM: 401.9     Mixed hyperlipidemia     ICD-10-CM: E78.2  ICD-9-CM: 272.2       Continue insulin as taking.    Continue statin.    Continue BP meds.         Orders Placed This Encounter   Procedures   • FluLaval Quad >6 Months (5354-3125)   • Comprehensive Metabolic Panel   •  Hemoglobin A1c   • Lipid Panel With / Chol / HDL Ratio       Current Outpatient Medications   Medication Sig Dispense Refill   • amLODIPine (NORVASC) 10 MG tablet TAKE 1 TABLET BY MOUTH EVERY DAY 90 tablet 1   • calcitriol (ROCALTROL) 0.25 MCG capsule Take 1 capsule by mouth Every Other Day.     • carvedilol (Coreg) 12.5 MG tablet Take 1 tablet by mouth 2 (Two) Times a Day With Meals. 180 tablet 3   • glucose blood test strip Check BS TID before meals 300 each 3   • haloperidol (HALDOL) 5 MG tablet Take 5 mg by mouth 2 (Two) Times a Day.  0   • hydrALAZINE (APRESOLINE) 100 MG tablet Take 1 tablet by mouth 2 (Two) Times a Day. 180 tablet 3   • insulin aspart (NovoLOG FlexPen) 100 UNIT/ML solution pen-injector sc pen Inject 4 Units under the skin into the appropriate area as directed 3 (Three) Times a Day With Meals. 5 pen 5   • insulin degludec (Tresiba FlexTouch) 100 UNIT/ML solution pen-injector injection Inject 18 Units under the skin into the appropriate area as directed Daily. 5 pen 5   • lisinopril (PRINIVIL,ZESTRIL) 40 MG tablet Take 1 tablet by mouth Daily. 90 tablet 2   • nortriptyline (PAMELOR) 50 MG capsule Take 2 capsules by mouth Every Night.     • pravastatin (Pravachol) 20 MG tablet Take 1 tablet by mouth Daily. 90 tablet 1   • tamsulosin (FLOMAX) 0.4 MG capsule 24 hr capsule TAKE 1 CAPSULE BY MOUTH EVERY DAY 90 capsule 1   • topiramate (TOPAMAX) 25 MG tablet Take 2 tablets by mouth Every Night.     • linaclotide (LINZESS) 145 MCG capsule capsule Take 1 capsule by mouth Every Morning Before Breakfast. 30 capsule      No current facility-administered medications for this visit.        Krish Castro had no medications administered during this visit.    Return in about 3 months (around 12/24/2020).    There are no Patient Instructions on file for this visit.

## 2020-09-25 LAB
ALBUMIN SERPL-MCNC: 4.3 G/DL (ref 3.5–5.2)
ALBUMIN/GLOB SERPL: 2.2 G/DL
ALP SERPL-CCNC: 110 U/L (ref 39–117)
ALT SERPL-CCNC: 17 U/L (ref 1–41)
AST SERPL-CCNC: 7 U/L (ref 1–40)
BILIRUB SERPL-MCNC: 0.5 MG/DL (ref 0–1.2)
BUN SERPL-MCNC: 17 MG/DL (ref 8–23)
BUN/CREAT SERPL: 11.2 (ref 7–25)
CALCIUM SERPL-MCNC: 9 MG/DL (ref 8.6–10.5)
CHLORIDE SERPL-SCNC: 106 MMOL/L (ref 98–107)
CHOLEST SERPL-MCNC: 140 MG/DL (ref 0–200)
CHOLEST/HDLC SERPL: 2.75 {RATIO}
CO2 SERPL-SCNC: 27.2 MMOL/L (ref 22–29)
CREAT SERPL-MCNC: 1.52 MG/DL (ref 0.76–1.27)
GLOBULIN SER CALC-MCNC: 2 GM/DL
GLUCOSE SERPL-MCNC: 112 MG/DL (ref 65–99)
HBA1C MFR BLD: 5.7 % (ref 4.8–5.6)
HDLC SERPL-MCNC: 51 MG/DL (ref 40–60)
LDLC SERPL CALC-MCNC: 78 MG/DL (ref 0–100)
POTASSIUM SERPL-SCNC: 4.9 MMOL/L (ref 3.5–5.2)
PROT SERPL-MCNC: 6.3 G/DL (ref 6–8.5)
SODIUM SERPL-SCNC: 142 MMOL/L (ref 136–145)
TRIGL SERPL-MCNC: 57 MG/DL (ref 0–150)
VLDLC SERPL CALC-MCNC: 11.4 MG/DL

## 2020-10-15 RX ORDER — PRAVASTATIN SODIUM 20 MG
TABLET ORAL
Qty: 90 TABLET | Refills: 1 | Status: SHIPPED | OUTPATIENT
Start: 2020-10-15 | End: 2021-04-06

## 2020-12-16 ENCOUNTER — OFFICE VISIT (OUTPATIENT)
Dept: FAMILY MEDICINE CLINIC | Facility: CLINIC | Age: 70
End: 2020-12-16

## 2020-12-16 VITALS
DIASTOLIC BLOOD PRESSURE: 66 MMHG | OXYGEN SATURATION: 97 % | WEIGHT: 215 LBS | HEIGHT: 72 IN | HEART RATE: 61 BPM | BODY MASS INDEX: 29.12 KG/M2 | TEMPERATURE: 96.8 F | SYSTOLIC BLOOD PRESSURE: 121 MMHG

## 2020-12-16 DIAGNOSIS — N18.2 CHRONIC KIDNEY DISEASE (CKD) STAGE G2/A1, MILDLY DECREASED GLOMERULAR FILTRATION RATE (GFR) BETWEEN 60-89 ML/MIN/1.73 SQUARE METER AND ALBUMINURIA CREATININE RATIO LESS THAN 30 MG/G: ICD-10-CM

## 2020-12-16 DIAGNOSIS — E78.2 MIXED HYPERLIPIDEMIA: ICD-10-CM

## 2020-12-16 DIAGNOSIS — E11.42 TYPE 2 DIABETES MELLITUS WITH DIABETIC POLYNEUROPATHY, WITH LONG-TERM CURRENT USE OF INSULIN (HCC): Primary | ICD-10-CM

## 2020-12-16 DIAGNOSIS — I10 HYPERTENSION, ESSENTIAL: ICD-10-CM

## 2020-12-16 DIAGNOSIS — Z79.4 TYPE 2 DIABETES MELLITUS WITH DIABETIC POLYNEUROPATHY, WITH LONG-TERM CURRENT USE OF INSULIN (HCC): Primary | ICD-10-CM

## 2020-12-16 PROCEDURE — 99214 OFFICE O/P EST MOD 30 MIN: CPT | Performed by: FAMILY MEDICINE

## 2020-12-16 RX ORDER — LISINOPRIL 40 MG/1
40 TABLET ORAL DAILY
Qty: 90 TABLET | Refills: 2 | Status: SHIPPED | OUTPATIENT
Start: 2020-12-16 | End: 2021-09-14

## 2020-12-16 RX ORDER — SODIUM, POTASSIUM,MAG SULFATES 17.5-3.13G
SOLUTION, RECONSTITUTED, ORAL ORAL
COMMUNITY
Start: 2020-11-19 | End: 2021-04-22

## 2020-12-16 RX ORDER — CARVEDILOL 6.25 MG/1
6.25 TABLET ORAL 2 TIMES DAILY
COMMUNITY
Start: 2020-12-06

## 2020-12-16 RX ORDER — SODIUM, POTASSIUM,MAG SULFATES 17.5-3.13G
354 SOLUTION, RECONSTITUTED, ORAL ORAL
COMMUNITY
Start: 2020-11-19 | End: 2021-04-22

## 2020-12-16 NOTE — PROGRESS NOTES
Chief Complaint   Patient presents with   • Diabetes       Subjective   Krish Castro is a 70 y.o. male.     History of Present Illness   F/U DM2.  On tresiba 18 units a day.   On novolog 4 units with meals.  Occ goes up to 180.  Usually stays low 100s.  A1C 5.7 2 months ago.    F/U HTN.  No orthostasis.  On meds regularly.  Cr 1.5 2 months ago.    F/U hyperlipidmeia.  No myalgias.  LDL 78 2 months ago.    F/U CKD stage 2.  No edema.    The following portions of the patient's history were reviewed and updated as appropriate: allergies, current medications, past family history, past medical history, past social history, past surgical history and problem list.    Review of Systems   Constitutional: Negative for appetite change and fatigue.   HENT: Negative for nosebleeds and sore throat.    Eyes: Negative for blurred vision and visual disturbance.   Respiratory: Negative for shortness of breath and wheezing.    Cardiovascular: Negative for chest pain and leg swelling.   Gastrointestinal: Negative for abdominal distention and abdominal pain.   Endocrine: Negative for cold intolerance and polyuria.   Genitourinary: Negative for dysuria and hematuria.   Musculoskeletal: Negative for arthralgias and myalgias.   Skin: Negative for color change and rash.   Neurological: Negative for weakness and confusion.   Psychiatric/Behavioral: Negative for agitation and depressed mood.       Patient Active Problem List   Diagnosis   • Seasonal allergic rhinitis due to pollen   • BPH (benign prostatic hyperplasia)   • Constipation   • Hypertension, essential   • Depression   • Diabetic peripheral neuropathy (CMS/[facility])   • Hyperlipidemia   • Type 2 diabetes mellitus with diabetic polyneuropathy, with long-term current use of insulin (CMS/[facility])   • Medicare annual wellness visit, subsequent   • Chronic kidney disease (CKD) stage G2/A1, mildly decreased glomerular filtration rate (GFR) between 60-89 mL/min/1.73 square meter and albuminuria  creatinine ratio less than 30 mg/g   • Thyroid disorder   • Vitamin D deficiency       Allergies   Allergen Reactions   • Hydrocodone Itching and Other (See Comments)     Headaches/trembles         Current Outpatient Medications:   •  amLODIPine (NORVASC) 10 MG tablet, TAKE 1 TABLET BY MOUTH EVERY DAY, Disp: 90 tablet, Rfl: 1  •  calcitriol (ROCALTROL) 0.25 MCG capsule, Take 1 capsule by mouth Every Other Day., Disp: , Rfl:   •  carvedilol (COREG) 6.25 MG tablet, Take 6.25 mg by mouth 2 (Two) Times a Day., Disp: , Rfl:   •  glucose blood test strip, Check BS TID before meals, Disp: 300 each, Rfl: 3  •  haloperidol (HALDOL) 5 MG tablet, Take 5 mg by mouth 2 (Two) Times a Day., Disp: , Rfl: 0  •  hydrALAZINE (APRESOLINE) 100 MG tablet, Take 1 tablet by mouth 2 (Two) Times a Day., Disp: 180 tablet, Rfl: 3  •  insulin aspart (NovoLOG FlexPen) 100 UNIT/ML solution pen-injector sc pen, Inject 4 Units under the skin into the appropriate area as directed 3 (Three) Times a Day With Meals., Disp: 5 pen, Rfl: 5  •  insulin degludec (Tresiba FlexTouch) 100 UNIT/ML solution pen-injector injection, Inject 18 Units under the skin into the appropriate area as directed Daily., Disp: 5 pen, Rfl: 5  •  lisinopril (PRINIVIL,ZESTRIL) 40 MG tablet, Take 1 tablet by mouth Daily., Disp: 90 tablet, Rfl: 2  •  miconazole (MICOTIN) 2 % cream, Apply  topically to the appropriate area as directed., Disp: , Rfl:   •  nortriptyline (PAMELOR) 50 MG capsule, Take 2 capsules by mouth Every Night., Disp: , Rfl:   •  pravastatin (PRAVACHOL) 20 MG tablet, TAKE 1 TABLET BY MOUTH EVERY DAY, Disp: 90 tablet, Rfl: 1  •  sodium-potassium-magnesium sulfates (SUPREP) 17.5-3.13-1.6 GM/177ML solution oral solution, Take 354 mL by mouth., Disp: , Rfl:   •  Suprep Bowel Prep Kit 17.5-3.13-1.6 GM/177ML solution oral solution, TAKE 354 MLS BY MOUTH ONCE FOR 1 DOSE., Disp: , Rfl:   •  tamsulosin (FLOMAX) 0.4 MG capsule 24 hr capsule, TAKE 1 CAPSULE BY MOUTH EVERY  DAY, Disp: 90 capsule, Rfl: 1  •  topiramate (TOPAMAX) 25 MG tablet, Take 2 tablets by mouth Every Night., Disp: , Rfl:     Past Medical History:   Diagnosis Date   • Allergic rhinitis    • BPH (benign prostatic hyperplasia)    • Chronic kidney disease (CKD) stage G2/A1, mildly decreased glomerular filtration rate (GFR) between 60-89 mL/min/1.73 square meter and albuminuria creatinine ratio less than 30 mg/g    • Constipation    • Constipation, chronic    • Depression    • Diabetes mellitus (CMS/HCC)    • Diabetic peripheral neuropathy (CMS/HCC)    • Former smoker    • High blood pressure    • High cholesterol    • Hyperlipidemia    • Hypertension, essential    • Inflamed skin tag    • Medicare annual wellness visit, subsequent    • Prostate disease    • Rash    • Seasonal allergic rhinitis due to pollen    • Stage 2 chronic kidney disease    • Thyroid disorder    • Tinea cruris    • Type 1 diabetes mellitus (CMS/HCC)    • Vitamin D deficiency        Past Surgical History:   Procedure Laterality Date   • COLONOSCOPY  11/2015    2 polyps   • OTHER SURGICAL HISTORY      Sialolithotomy-Abstracted from [employer]       Family History   Problem Relation Age of Onset   • Hyperlipidemia Mother    • Colon cancer Mother    • Hypertension Mother    • Anxiety disorder Mother    • Heart disease Mother    • Diabetes type I Father    • Colon cancer Brother    • Diabetes type I Brother    • Heart disease Other         In females before age 65, and males before age 55       Social History     Tobacco Use   • Smoking status: Former Smoker     Packs/day: 1.00     Years: 5.00     Pack years: 5.00     Types: Cigarettes   • Smokeless tobacco: Never Used   • Tobacco comment: smoked less than 1 pack per day for less than 5 years   Substance Use Topics   • Alcohol use: Yes     Frequency: Never     Comment: drinks alcohol, 7 or less drinks per week            Objective     Vitals:    12/16/20 1302   BP: 121/66   Pulse: 61   Temp: 96.8 °F  (36 °C)   SpO2: 97%     Body mass index is 29.15 kg/m².    Physical Exam  Vitals signs reviewed.   Constitutional:       Appearance: He is well-developed. He is not diaphoretic.   HENT:      Head: Normocephalic and atraumatic.   Eyes:      General: No scleral icterus.     Pupils: Pupils are equal, round, and reactive to light.   Neck:      Thyroid: No thyromegaly.   Cardiovascular:      Rate and Rhythm: Normal rate and regular rhythm.      Heart sounds: No murmur. No friction rub. No gallop.    Pulmonary:      Effort: Pulmonary effort is normal. No respiratory distress.      Breath sounds: No wheezing or rales.   Chest:      Chest wall: No tenderness.   Abdominal:      General: Bowel sounds are normal. There is no distension.      Palpations: Abdomen is soft.      Tenderness: There is no abdominal tenderness.   Musculoskeletal: Normal range of motion.         General: No deformity.   Lymphadenopathy:      Cervical: No cervical adenopathy.   Skin:     General: Skin is warm and dry.      Findings: No rash.   Neurological:      Cranial Nerves: No cranial nerve deficit.      Motor: No abnormal muscle tone.         Lab Results   Component Value Date    BUN 17 09/24/2020    CREATININE 1.52 (H) 09/24/2020    EGFRIFNONA 45 (L) 09/24/2020    EGFRIFAFRI 55 (L) 09/24/2020    BCR 11.2 09/24/2020    K 4.9 09/24/2020    CO2 27.2 09/24/2020    CALCIUM 9.0 09/24/2020    PROTENTOTREF 6.3 09/24/2020    ALBUMIN 4.30 09/24/2020    LABIL2 2.2 09/24/2020    AST 7 09/24/2020    ALT 17 09/24/2020       No results found for: WBC, RBC, HGB, HCT, MCV, MCH, MCHC, RDW, RDWSD, MPV, PLT, NEUTRORELPCT, LYMPHORELPCT, MONORELPCT, EOSRELPCT, BASORELPCT, AUTOIGPER, NEUTROABS, LYMPHSABS, MONOSABS, EOSABS, BASOSABS, AUTOIGNUM, NRBC    Lab Results   Component Value Date    HGBA1C 5.70 (H) 09/24/2020       No results found for: [id number]    TSH   Date Value Ref Range Status   09/24/2019 2.640 0.270 - 4.200 uIU/mL Final       No results found for:  CHOL  Lab Results   Component Value Date    TRIG 57 09/24/2020     Lab Results   Component Value Date    HDL 51 09/24/2020     Lab Results   Component Value Date    LDL 78 09/24/2020     Lab Results   Component Value Date    VLDL 11.4 09/24/2020     No results found for: LDLHDL      Procedures    Assessment/Plan   Problems Addressed this Visit        Cardiovascular and Mediastinum    Hypertension, essential    Relevant Medications    carvedilol (COREG) 6.25 MG tablet    lisinopril (PRINIVIL,ZESTRIL) 40 MG tablet    Hyperlipidemia       Endocrine    Type 2 diabetes mellitus with diabetic polyneuropathy, with long-term current use of insulin (CMS/[facility]) - Primary       Genitourinary    Chronic kidney disease (CKD) stage G2/A1, mildly decreased glomerular filtration rate (GFR) between 60-89 mL/min/1.73 square meter and albuminuria creatinine ratio less than 30 mg/g      Diagnoses       Codes Comments    Type 2 diabetes mellitus with diabetic polyneuropathy, with long-term current use of insulin (CMS/[facility])    -  Primary ICD-10-CM: E11.42, Z79.4  ICD-9-CM: 250.60, 357.2, V58.67     Mixed hyperlipidemia     ICD-10-CM: E78.2  ICD-9-CM: 272.2     Hypertension, essential     ICD-10-CM: I10  ICD-9-CM: 401.9     Chronic kidney disease (CKD) stage G2/A1, mildly decreased glomerular filtration rate (GFR) between 60-89 mL/min/1.73 square meter and albuminuria creatinine ratio less than 30 mg/g     ICD-10-CM: N18.2  ICD-9-CM: 585.2       Continue same insulin.    Continue statin.    IUTD.    No orders of the defined types were placed in this encounter.      Current Outpatient Medications   Medication Sig Dispense Refill   • amLODIPine (NORVASC) 10 MG tablet TAKE 1 TABLET BY MOUTH EVERY DAY 90 tablet 1   • calcitriol (ROCALTROL) 0.25 MCG capsule Take 1 capsule by mouth Every Other Day.     • carvedilol (COREG) 6.25 MG tablet Take 6.25 mg by mouth 2 (Two) Times a Day.     • glucose blood test strip Check BS TID before meals 300 each 3    • haloperidol (HALDOL) 5 MG tablet Take 5 mg by mouth 2 (Two) Times a Day.  0   • hydrALAZINE (APRESOLINE) 100 MG tablet Take 1 tablet by mouth 2 (Two) Times a Day. 180 tablet 3   • insulin aspart (NovoLOG FlexPen) 100 UNIT/ML solution pen-injector sc pen Inject 4 Units under the skin into the appropriate area as directed 3 (Three) Times a Day With Meals. 5 pen 5   • insulin degludec (Tresiba FlexTouch) 100 UNIT/ML solution pen-injector injection Inject 18 Units under the skin into the appropriate area as directed Daily. 5 pen 5   • lisinopril (PRINIVIL,ZESTRIL) 40 MG tablet Take 1 tablet by mouth Daily. 90 tablet 2   • miconazole (MICOTIN) 2 % cream Apply  topically to the appropriate area as directed.     • nortriptyline (PAMELOR) 50 MG capsule Take 2 capsules by mouth Every Night.     • pravastatin (PRAVACHOL) 20 MG tablet TAKE 1 TABLET BY MOUTH EVERY DAY 90 tablet 1   • sodium-potassium-magnesium sulfates (SUPREP) 17.5-3.13-1.6 GM/177ML solution oral solution Take 354 mL by mouth.     • Suprep Bowel Prep Kit 17.5-3.13-1.6 GM/177ML solution oral solution TAKE 354 MLS BY MOUTH ONCE FOR 1 DOSE.     • tamsulosin (FLOMAX) 0.4 MG capsule 24 hr capsule TAKE 1 CAPSULE BY MOUTH EVERY DAY 90 capsule 1   • topiramate (TOPAMAX) 25 MG tablet Take 2 tablets by mouth Every Night.       No current facility-administered medications for this visit.        Krish Castro had no medications administered during this visit.    Return in about 3 months (around 3/16/2021) for Medicare wellness and OV, 30 minutes.    There are no Patient Instructions on file for this visit.

## 2021-03-16 ENCOUNTER — OFFICE VISIT (OUTPATIENT)
Dept: FAMILY MEDICINE CLINIC | Facility: CLINIC | Age: 71
End: 2021-03-16

## 2021-03-16 VITALS
HEIGHT: 72 IN | OXYGEN SATURATION: 99 % | SYSTOLIC BLOOD PRESSURE: 102 MMHG | TEMPERATURE: 97.8 F | BODY MASS INDEX: 28.71 KG/M2 | DIASTOLIC BLOOD PRESSURE: 56 MMHG | WEIGHT: 212 LBS | HEART RATE: 63 BPM

## 2021-03-16 DIAGNOSIS — Z00.00 MEDICARE ANNUAL WELLNESS VISIT, SUBSEQUENT: Primary | ICD-10-CM

## 2021-03-16 DIAGNOSIS — N18.2 CHRONIC KIDNEY DISEASE (CKD) STAGE G2/A1, MILDLY DECREASED GLOMERULAR FILTRATION RATE (GFR) BETWEEN 60-89 ML/MIN/1.73 SQUARE METER AND ALBUMINURIA CREATININE RATIO LESS THAN 30 MG/G: ICD-10-CM

## 2021-03-16 DIAGNOSIS — E11.42 TYPE 2 DIABETES MELLITUS WITH DIABETIC POLYNEUROPATHY, WITH LONG-TERM CURRENT USE OF INSULIN (HCC): ICD-10-CM

## 2021-03-16 DIAGNOSIS — N40.0 BENIGN PROSTATIC HYPERPLASIA, UNSPECIFIED WHETHER LOWER URINARY TRACT SYMPTOMS PRESENT: ICD-10-CM

## 2021-03-16 DIAGNOSIS — Z79.4 TYPE 2 DIABETES MELLITUS WITH DIABETIC POLYNEUROPATHY, WITH LONG-TERM CURRENT USE OF INSULIN (HCC): ICD-10-CM

## 2021-03-16 DIAGNOSIS — I10 HYPERTENSION, ESSENTIAL: ICD-10-CM

## 2021-03-16 DIAGNOSIS — E78.2 MIXED HYPERLIPIDEMIA: ICD-10-CM

## 2021-03-16 PROCEDURE — G0439 PPPS, SUBSEQ VISIT: HCPCS | Performed by: FAMILY MEDICINE

## 2021-03-16 PROCEDURE — 99214 OFFICE O/P EST MOD 30 MIN: CPT | Performed by: FAMILY MEDICINE

## 2021-03-16 RX ORDER — AMLODIPINE BESYLATE 10 MG/1
TABLET ORAL
Qty: 90 TABLET | Refills: 1 | Status: SHIPPED | OUTPATIENT
Start: 2021-03-16 | End: 2021-03-16 | Stop reason: SDUPTHER

## 2021-03-16 RX ORDER — MELATONIN
1000 2 TIMES DAILY
COMMUNITY

## 2021-03-16 RX ORDER — PEN NEEDLE, DIABETIC 30 GX5/16"
NEEDLE, DISPOSABLE MISCELLANEOUS
Qty: 200 EACH | Refills: 5 | Status: SHIPPED | OUTPATIENT
Start: 2021-03-16

## 2021-03-16 RX ORDER — TAMSULOSIN HYDROCHLORIDE 0.4 MG/1
CAPSULE ORAL
Qty: 90 CAPSULE | Refills: 1 | Status: SHIPPED | OUTPATIENT
Start: 2021-03-16 | End: 2021-10-18

## 2021-03-16 RX ORDER — AMLODIPINE BESYLATE 5 MG/1
5 TABLET ORAL DAILY
Qty: 90 TABLET | Refills: 3 | Status: SHIPPED | OUTPATIENT
Start: 2021-03-16 | End: 2022-03-06

## 2021-03-16 NOTE — PROGRESS NOTES
The ABCs of the Annual Wellness Visit  Subsequent Medicare Wellness Visit    Chief Complaint   Patient presents with   • Medicare Wellness-subsequent       Subjective   History of Present Illness:  Krish Castro is a 71 y.o. male who presents for a Subsequent Medicare Wellness Visit.  F/U DM2.  BS running 110-180s.  No lows.  LDL 78 5 months ago.     F/U HTN.  On meds regularly.  No orthostasis.    HEALTH RISK ASSESSMENT    Recent Hospitalizations:  No hospitalization(s) within the last year.    Current Medical Providers:  Patient Care Team:  Dimitris David MD as PCP - General (Family Medicine)    Smoking Status:  Social History     Tobacco Use   Smoking Status Former Smoker   • Packs/day: 1.00   • Years: 5.00   • Pack years: 5.00   • Types: Cigarettes   Smokeless Tobacco Never Used   Tobacco Comment    smoked less than 1 pack per day for less than 5 years       Alcohol Consumption:  Social History     Substance and Sexual Activity   Alcohol Use Yes    Comment: drinks alcohol, 7 or less drinks per week       Depression Screen:   PHQ-2/PHQ-9 Depression Screening 12/18/2019   Little interest or pleasure in doing things 0   Feeling down, depressed, or hopeless 0   Total Score 0       Fall Risk Screen:  STEADI Fall Risk Assessment has not been completed.    Health Habits and Functional and Cognitive Screening:  Functional & Cognitive Status 3/16/2021   Do you have difficulty preparing food and eating? No   Do you have difficulty bathing yourself, getting dressed or grooming yourself? No   Do you have difficulty using the toilet? No   Do you have difficulty moving around from place to place? No   Do you have trouble with steps or getting out of a bed or a chair? No   Current Diet Well Balanced Diet   Dental Exam Up to date   Eye Exam Up to date   Exercise (times per week) 3 times per week   Current Exercises Include Walking;Bicycling Outdoors   Current Exercise Activities Include -   Do you need help using the phone?   No   Are you deaf or do you have serious difficulty hearing?  No   Do you need help with transportation? No   Do you need help shopping? No   Do you need help preparing meals?  No   Do you need help with housework?  No   Do you need help with laundry? No   Do you need help taking your medications? No   Do you need help managing money? No   Do you ever drive or ride in a car without wearing a seat belt? No   Have you felt unusual stress, anger or loneliness in the last month? No   Who do you live with? Alone   If you need help, do you have trouble finding someone available to you? No   Have you been bothered in the last four weeks by sexual problems? -   Do you have difficulty concentrating, remembering or making decisions? No         Does the patient have evidence of cognitive impairment? No    Asprin use counseling:Does not need ASA (and currently is not on it)    Age-appropriate Screening Schedule:  Refer to the list below for future screening recommendations based on patient's age, sex and/or medical conditions. Orders for these recommended tests are listed in the plan section. The patient has been provided with a written plan.    Health Maintenance   Topic Date Due   • URINE MICROALBUMIN  Never done   • TDAP/TD VACCINES (1 - Tdap) 02/28/1969   • DIABETIC FOOT EXAM  Never done   • COLONOSCOPY  11/19/2020   • HEMOGLOBIN A1C  03/24/2021   • DIABETIC EYE EXAM  07/15/2021   • LIPID PANEL  09/24/2021   • INFLUENZA VACCINE  Completed   • ZOSTER VACCINE  Completed          The following portions of the patient's history were reviewed and updated as appropriate: allergies, current medications, past family history, past medical history, past social history, past surgical history and problem list.    Outpatient Medications Prior to Visit   Medication Sig Dispense Refill   • carvedilol (COREG) 6.25 MG tablet Take 6.25 mg by mouth 2 (Two) Times a Day.     • cholecalciferol (VITAMIN D3) 25 MCG (1000 UT) tablet Take 1,000  Units by mouth 2 (two) times a day.     • glucose blood test strip Check BS TID before meals 300 each 3   • haloperidol (HALDOL) 5 MG tablet Take 5 mg by mouth 2 (Two) Times a Day.  0   • hydrALAZINE (APRESOLINE) 100 MG tablet Take 1 tablet by mouth 2 (Two) Times a Day. 180 tablet 3   • insulin aspart (NovoLOG FlexPen) 100 UNIT/ML solution pen-injector sc pen Inject 4 Units under the skin into the appropriate area as directed 3 (Three) Times a Day With Meals. 5 pen 5   • insulin degludec (Tresiba FlexTouch) 100 UNIT/ML solution pen-injector injection Inject 18 Units under the skin into the appropriate area as directed Daily. 5 pen 5   • lisinopril (PRINIVIL,ZESTRIL) 40 MG tablet Take 1 tablet by mouth Daily. 90 tablet 2   • nortriptyline (PAMELOR) 50 MG capsule Take 2 capsules by mouth Every Night.     • pravastatin (PRAVACHOL) 20 MG tablet TAKE 1 TABLET BY MOUTH EVERY DAY 90 tablet 1   • tamsulosin (FLOMAX) 0.4 MG capsule 24 hr capsule TAKE 1 CAPSULE BY MOUTH EVERY DAY 90 capsule 1   • amLODIPine (NORVASC) 10 MG tablet TAKE 1 TABLET BY MOUTH EVERY DAY 90 tablet 1   • calcitriol (ROCALTROL) 0.25 MCG capsule Take 1 capsule by mouth Every Other Day.     • sodium-potassium-magnesium sulfates (SUPREP) 17.5-3.13-1.6 GM/177ML solution oral solution Take 354 mL by mouth.     • Suprep Bowel Prep Kit 17.5-3.13-1.6 GM/177ML solution oral solution TAKE 354 MLS BY MOUTH ONCE FOR 1 DOSE.     • topiramate (TOPAMAX) 25 MG tablet Take 2 tablets by mouth Every Night.       No facility-administered medications prior to visit.       Patient Active Problem List   Diagnosis   • Seasonal allergic rhinitis due to pollen   • BPH (benign prostatic hyperplasia)   • Constipation   • Hypertension, essential   • Depression   • Diabetic peripheral neuropathy (CMS/HCC)   • Hyperlipidemia   • Type 2 diabetes mellitus with diabetic polyneuropathy, with long-term current use of insulin (CMS/HCC)   • Medicare annual wellness visit, subsequent   •  "Chronic kidney disease (CKD) stage G2/A1, mildly decreased glomerular filtration rate (GFR) between 60-89 mL/min/1.73 square meter and albuminuria creatinine ratio less than 30 mg/g   • Thyroid disorder   • Vitamin D deficiency       Advanced Care Planning:  ACP discussion was held with the patient during this visit. Patient has an advance directive (not in EMR), copy requested.    Review of Systems   Constitutional: Negative for activity change, appetite change and fatigue.   HENT: Negative for hearing loss and postnasal drip.    Eyes: Negative for discharge and itching.   Respiratory: Negative for cough and shortness of breath.    Cardiovascular: Negative for chest pain and leg swelling.   Gastrointestinal: Negative for abdominal distention and abdominal pain.   Endocrine: Negative for cold intolerance and heat intolerance.   Genitourinary: Negative for difficulty urinating and flank pain.   Musculoskeletal: Negative for arthralgias and myalgias.   Skin: Negative for color change.   Neurological: Negative for dizziness and facial asymmetry.   Hematological: Negative for adenopathy.   Psychiatric/Behavioral: Negative for agitation and confusion.       Compared to one year ago, the patient feels his physical health is the same.  Compared to one year ago, the patient feels his mental health is the same.    Reviewed chart for potential of high risk medication in the elderly: yes  Reviewed chart for potential of harmful drug interactions in the elderly:yes    Objective         Vitals:    03/16/21 1424   BP: 102/56   BP Location: Right arm   Patient Position: Sitting   Pulse: 63   Temp: 97.8 °F (36.6 °C)   TempSrc: Temporal   SpO2: 99%   Weight: 96.2 kg (212 lb)   Height: 182.9 cm (72\")       Body mass index is 28.75 kg/m².  Discussed the patient's BMI with him. The BMI is above average; BMI management plan is completed.    Physical Exam  Vitals reviewed.   Constitutional:       Appearance: He is well-developed. He is " "not diaphoretic.   HENT:      Head: Normocephalic and atraumatic.   Eyes:      General: No scleral icterus.     Pupils: Pupils are equal, round, and reactive to light.   Neck:      Thyroid: No thyromegaly.   Cardiovascular:      Rate and Rhythm: Normal rate and regular rhythm.      Heart sounds: No murmur. No friction rub. No gallop.    Pulmonary:      Effort: Pulmonary effort is normal. No respiratory distress.      Breath sounds: No wheezing or rales.   Chest:      Chest wall: No tenderness.   Abdominal:      General: Bowel sounds are normal. There is no distension.      Palpations: Abdomen is soft.      Tenderness: There is no abdominal tenderness.   Musculoskeletal:         General: No deformity. Normal range of motion.   Lymphadenopathy:      Cervical: No cervical adenopathy.   Skin:     General: Skin is warm and dry.      Findings: No rash.   Neurological:      Cranial Nerves: No cranial nerve deficit.      Motor: No abnormal muscle tone.               Assessment/Plan   Medicare Risks and Personalized Health Plan  CMS Preventative Services Quick Reference  Inactivity/Sedentary    The above risks/problems have been discussed with the patient.  Pertinent information has been shared with the patient in the After Visit Summary.  Follow up plans and orders are seen below in the Assessment/Plan Section.    Diagnoses and all orders for this visit:    1. Medicare annual wellness visit, subsequent (Primary)    2. Hypertension, essential  -     amLODIPine (NORVASC) 5 MG tablet; Take 1 tablet by mouth Daily.  Dispense: 90 tablet; Refill: 3    3. Mixed hyperlipidemia    4. Chronic kidney disease (CKD) stage G2/A1, mildly decreased glomerular filtration rate (GFR) between 60-89 mL/min/1.73 square meter and albuminuria creatinine ratio less than 30 mg/g    5. Type 2 diabetes mellitus with diabetic polyneuropathy, with long-term current use of insulin (CMS/[facility])  -     Insulin Pen Needle (Pen Needles 3/16\") 31G X 5 MM misc; " Inject insulin 4 times a day as directed  Dispense: 200 each; Refill: 5    Hypertension uncontrolled.  Decrease amlodipine to 5mg a day.  Continue other meds.   DM2.  Questionable control.  Check A1c.  Check CMP.    Hyperlipidemia controlled.  Continue statin.      Follow Up:  Return in about 3 months (around 6/16/2021).     An After Visit Summary and PPPS were given to the patient.       Preventive counseling:  Encouraged regular exercise.  Pneumovax utd/shingrix utd/hep a utd/flu utd. PSA utd. C scope utd.   Encouraged to sign up for covid vaccine.

## 2021-03-17 ENCOUNTER — TELEPHONE (OUTPATIENT)
Dept: FAMILY MEDICINE CLINIC | Facility: CLINIC | Age: 71
End: 2021-03-17

## 2021-03-17 LAB
ALBUMIN SERPL-MCNC: 3.9 G/DL (ref 3.5–5.2)
ALBUMIN/GLOB SERPL: 1.6 G/DL
ALP SERPL-CCNC: 131 U/L (ref 39–117)
ALT SERPL-CCNC: 16 U/L (ref 1–41)
AST SERPL-CCNC: 11 U/L (ref 1–40)
BILIRUB SERPL-MCNC: 0.4 MG/DL (ref 0–1.2)
BUN SERPL-MCNC: 17 MG/DL (ref 8–23)
BUN/CREAT SERPL: 9.9 (ref 7–25)
CALCIUM SERPL-MCNC: 9.5 MG/DL (ref 8.6–10.5)
CHLORIDE SERPL-SCNC: 108 MMOL/L (ref 98–107)
CO2 SERPL-SCNC: 28.9 MMOL/L (ref 22–29)
CREAT SERPL-MCNC: 1.72 MG/DL (ref 0.76–1.27)
GLOBULIN SER CALC-MCNC: 2.4 GM/DL
GLUCOSE SERPL-MCNC: 86 MG/DL (ref 65–99)
HBA1C MFR BLD: 6.6 % (ref 4.8–5.6)
POTASSIUM SERPL-SCNC: 5.3 MMOL/L (ref 3.5–5.2)
PROT SERPL-MCNC: 6.3 G/DL (ref 6–8.5)
SODIUM SERPL-SCNC: 142 MMOL/L (ref 136–145)

## 2021-03-17 NOTE — TELEPHONE ENCOUNTER
LVM for patient to return call to office regarding lab results.       Okay for HUB to read:        Tell him his labs look good with an A1c of 6.6.  Kidney function is stable.  Stay on the same plan as we discussed.  Thanks

## 2021-03-27 ENCOUNTER — IMMUNIZATION (OUTPATIENT)
Dept: VACCINE CLINIC | Facility: HOSPITAL | Age: 71
End: 2021-03-27

## 2021-03-27 PROCEDURE — 0011A: CPT | Performed by: INTERNAL MEDICINE

## 2021-03-27 PROCEDURE — 91301 HC SARSCO02 VAC 100MCG/0.5ML IM: CPT | Performed by: INTERNAL MEDICINE

## 2021-04-06 DIAGNOSIS — E11.42 TYPE 2 DIABETES MELLITUS WITH DIABETIC POLYNEUROPATHY, WITH LONG-TERM CURRENT USE OF INSULIN (HCC): ICD-10-CM

## 2021-04-06 DIAGNOSIS — Z79.4 TYPE 2 DIABETES MELLITUS WITH DIABETIC POLYNEUROPATHY, WITH LONG-TERM CURRENT USE OF INSULIN (HCC): ICD-10-CM

## 2021-04-06 RX ORDER — BLOOD SUGAR DIAGNOSTIC
STRIP MISCELLANEOUS
Qty: 300 EACH | Refills: 3 | Status: SHIPPED | OUTPATIENT
Start: 2021-04-06 | End: 2022-04-14 | Stop reason: SDUPTHER

## 2021-04-06 RX ORDER — PRAVASTATIN SODIUM 20 MG
20 TABLET ORAL DAILY
Qty: 90 TABLET | Refills: 3 | Status: SHIPPED | OUTPATIENT
Start: 2021-04-06 | End: 2022-04-07

## 2021-04-06 RX ORDER — AMLODIPINE BESYLATE 10 MG/1
TABLET ORAL
COMMUNITY
Start: 2021-03-17 | End: 2021-04-22 | Stop reason: SDUPTHER

## 2021-04-22 ENCOUNTER — OFFICE VISIT (OUTPATIENT)
Dept: FAMILY MEDICINE CLINIC | Facility: CLINIC | Age: 71
End: 2021-04-22

## 2021-04-22 VITALS
OXYGEN SATURATION: 99 % | WEIGHT: 215 LBS | SYSTOLIC BLOOD PRESSURE: 138 MMHG | HEART RATE: 60 BPM | HEIGHT: 72 IN | BODY MASS INDEX: 29.12 KG/M2 | TEMPERATURE: 98.2 F | DIASTOLIC BLOOD PRESSURE: 66 MMHG

## 2021-04-22 DIAGNOSIS — I10 HYPERTENSION, ESSENTIAL: Primary | ICD-10-CM

## 2021-04-22 DIAGNOSIS — E11.22 CKD STAGE 3 SECONDARY TO DIABETES (HCC): ICD-10-CM

## 2021-04-22 DIAGNOSIS — N18.30 CKD STAGE 3 SECONDARY TO DIABETES (HCC): ICD-10-CM

## 2021-04-22 PROCEDURE — 99214 OFFICE O/P EST MOD 30 MIN: CPT | Performed by: FAMILY MEDICINE

## 2021-04-22 NOTE — PROGRESS NOTES
Chief Complaint   Patient presents with   • Hypertension       Subjective   Krish Castro is a 71 y.o. male.     History of Present Illness   F/u HTN.  No orthostasis.  At times BP running 160-170/70s.   F/U CKD stage 3.  No edema.  CR 1.7 with K 5.3 1 month ago.    FU DM2.  A1c  6.6 one month ago.  Needs tresiba samples.    The following portions of the patient's history were reviewed and updated as appropriate: allergies, current medications, past family history, past medical history, past social history, past surgical history and problem list.    Review of Systems   Constitutional: Negative for appetite change and fatigue.   HENT: Negative for nosebleeds and sore throat.    Eyes: Negative for blurred vision and visual disturbance.   Respiratory: Negative for shortness of breath and wheezing.    Cardiovascular: Negative for chest pain and leg swelling.   Gastrointestinal: Negative for abdominal distention and abdominal pain.   Endocrine: Negative for cold intolerance and polyuria.   Genitourinary: Negative for dysuria and hematuria.   Musculoskeletal: Negative for arthralgias and myalgias.   Skin: Negative for color change and rash.   Neurological: Negative for weakness and confusion.   Psychiatric/Behavioral: Negative for agitation and depressed mood.       Patient Active Problem List   Diagnosis   • Seasonal allergic rhinitis due to pollen   • BPH (benign prostatic hyperplasia)   • Constipation   • Hypertension, essential   • Depression   • Diabetic peripheral neuropathy (CMS/[facility])   • Hyperlipidemia   • Type 2 diabetes mellitus with diabetic polyneuropathy, with long-term current use of insulin (CMS/[facility])   • Medicare annual wellness visit, subsequent   • CKD stage 3 secondary to diabetes (CMS/[facility])   • Thyroid disorder   • Vitamin D deficiency       Allergies   Allergen Reactions   • Hydrocodone Itching and Other (See Comments)     Headaches/trembles         Current Outpatient Medications:   •  amLODIPine  "(NORVASC) 5 MG tablet, Take 1 tablet by mouth Daily., Disp: 90 tablet, Rfl: 3  •  carvedilol (COREG) 6.25 MG tablet, Take 6.25 mg by mouth 2 (Two) Times a Day., Disp: , Rfl:   •  cholecalciferol (VITAMIN D3) 25 MCG (1000 UT) tablet, Take 1,000 Units by mouth 2 (two) times a day., Disp: , Rfl:   •  haloperidol (HALDOL) 5 MG tablet, Take 5 mg by mouth 2 (Two) Times a Day., Disp: , Rfl: 0  •  hydrALAZINE (APRESOLINE) 100 MG tablet, Take 1 tablet by mouth 2 (Two) Times a Day., Disp: 180 tablet, Rfl: 3  •  insulin aspart (NovoLOG FlexPen) 100 UNIT/ML solution pen-injector sc pen, Inject 4 Units under the skin into the appropriate area as directed 3 (Three) Times a Day With Meals., Disp: 5 pen, Rfl: 5  •  insulin degludec (Tresiba FlexTouch) 100 UNIT/ML solution pen-injector injection, Inject 18 Units under the skin into the appropriate area as directed Daily., Disp: 5 pen, Rfl: 5  •  Insulin Pen Needle (Pen Needles 3/16\") 31G X 5 MM misc, Inject insulin 4 times a day as directed, Disp: 200 each, Rfl: 5  •  lisinopril (PRINIVIL,ZESTRIL) 40 MG tablet, Take 1 tablet by mouth Daily., Disp: 90 tablet, Rfl: 2  •  nortriptyline (PAMELOR) 50 MG capsule, Take 2 capsules by mouth Every Night., Disp: , Rfl:   •  OneTouch Ultra test strip, USE TO CHECK 3 TIMES A DAY BEFORE MEALS, Disp: 300 each, Rfl: 3  •  pravastatin (PRAVACHOL) 20 MG tablet, Take 1 tablet by mouth Daily., Disp: 90 tablet, Rfl: 3  •  tamsulosin (FLOMAX) 0.4 MG capsule 24 hr capsule, TAKE 1 CAPSULE BY MOUTH EVERY DAY, Disp: 90 capsule, Rfl: 1  •  topiramate (TOPAMAX) 25 MG tablet, Take 2 tablets by mouth Every Night., Disp: , Rfl:   •  calcitriol (ROCALTROL) 0.25 MCG capsule, Take 1 capsule by mouth Every Other Day., Disp: , Rfl:   •  sodium-potassium-magnesium sulfates (SUPREP) 17.5-3.13-1.6 GM/177ML solution oral solution, Take 354 mL by mouth., Disp: , Rfl:   •  Suprep Bowel Prep Kit 17.5-3.13-1.6 GM/177ML solution oral solution, TAKE 354 MLS BY MOUTH ONCE FOR 1 " DOSE., Disp: , Rfl:     Past Medical History:   Diagnosis Date   • Allergic rhinitis    • BPH (benign prostatic hyperplasia)    • Chronic kidney disease (CKD) stage G2/A1, mildly decreased glomerular filtration rate (GFR) between 60-89 mL/min/1.73 square meter and albuminuria creatinine ratio less than 30 mg/g    • Constipation    • Constipation, chronic    • Depression    • Diabetes mellitus (CMS/HCC)    • Diabetic peripheral neuropathy (CMS/HCC)    • Former smoker    • High blood pressure    • High cholesterol    • Hyperlipidemia    • Hypertension, essential    • Inflamed skin tag    • Medicare annual wellness visit, subsequent    • Prostate disease    • Rash    • Seasonal allergic rhinitis due to pollen    • Stage 2 chronic kidney disease    • Thyroid disorder    • Tinea cruris    • Type 1 diabetes mellitus (CMS/HCC)    • Vitamin D deficiency        Past Surgical History:   Procedure Laterality Date   • COLONOSCOPY  12/24/20, 11/2015    3 polyps,  2 polyps   • OTHER SURGICAL HISTORY      Sialolithotomy-Abstracted from [facility] [place]       Family History   Problem Relation Age of Onset   • Hyperlipidemia Mother    • Colon cancer Mother    • Hypertension Mother    • Anxiety disorder Mother    • Heart disease Mother    • Diabetes type I Father    • Colon cancer Brother    • Diabetes type I Brother    • Heart disease Other         In females before age 65, and males before age 55       Social History     Tobacco Use   • Smoking status: Former Smoker     Packs/day: 1.00     Years: 5.00     Pack years: 5.00     Types: Cigarettes   • Smokeless tobacco: Never Used   • Tobacco comment: smoked less than 1 pack per day for less than 5 years   Substance Use Topics   • Alcohol use: Yes     Comment: drinks alcohol, 7 or less drinks per week            Objective     Vitals:    04/22/21 1403   BP: 138/66   Pulse:    Temp:    SpO2:      Body mass index is 29.16 kg/m².    Physical Exam  Vitals reviewed.   Constitutional:        Appearance: He is well-developed. He is not diaphoretic.   HENT:      Head: Normocephalic and atraumatic.   Eyes:      General: No scleral icterus.     Pupils: Pupils are equal, round, and reactive to light.   Neck:      Thyroid: No thyromegaly.   Cardiovascular:      Rate and Rhythm: Normal rate and regular rhythm.      Heart sounds: No murmur heard.   No friction rub. No gallop.    Pulmonary:      Effort: Pulmonary effort is normal. No respiratory distress.      Breath sounds: No wheezing or rales.   Chest:      Chest wall: No tenderness.   Abdominal:      General: Bowel sounds are normal. There is no distension.      Palpations: Abdomen is soft.      Tenderness: There is no abdominal tenderness.   Musculoskeletal:         General: No deformity. Normal range of motion.   Lymphadenopathy:      Cervical: No cervical adenopathy.   Skin:     General: Skin is warm and dry.      Findings: No rash.   Neurological:      Cranial Nerves: No cranial nerve deficit.      Motor: No abnormal muscle tone.         Lab Results   Component Value Date    BUN 17 03/16/2021    CREATININE 1.72 (H) 03/16/2021    EGFRIFNONA 39 (L) 03/16/2021    EGFRIFAFRI 48 (L) 03/16/2021    BCR 9.9 03/16/2021    K 5.3 (H) 03/16/2021    CO2 28.9 03/16/2021    CALCIUM 9.5 03/16/2021    PROTENTOTREF 6.3 03/16/2021    ALBUMIN 3.90 03/16/2021    LABIL2 1.6 03/16/2021    AST 11 03/16/2021    ALT 16 03/16/2021       No results found for: WBC, RBC, HGB, HCT, MCV, MCH, MCHC, RDW, RDWSD, MPV, PLT, NEUTRORELPCT, LYMPHORELPCT, MONORELPCT, EOSRELPCT, BASORELPCT, AUTOIGPER, NEUTROABS, LYMPHSABS, MONOSABS, EOSABS, BASOSABS, AUTOIGNUM, NRBC    Lab Results   Component Value Date    HGBA1C 6.60 (H) 03/16/2021       No results found for: [id number]    TSH   Date Value Ref Range Status   09/24/2019 2.640 0.270 - 4.200 uIU/mL Final       No results found for: CHOL  Lab Results   Component Value Date    TRIG 57 09/24/2020     Lab Results   Component Value Date    HDL 51  "09/24/2020     Lab Results   Component Value Date    LDL 78 09/24/2020     Lab Results   Component Value Date    VLDL 11.4 09/24/2020     No results found for: LDLHDL      Procedures    Assessment/Plan   Problems Addressed this Visit     CKD stage 3 secondary to diabetes (CMS/[facility])    Hypertension, essential - Primary      Diagnoses       Codes Comments    Hypertension, essential    -  Primary ICD-10-CM: I10  ICD-9-CM: 401.9     CKD stage 3 secondary to diabetes (CMS/[facility])     ICD-10-CM: E11.22, N18.30  ICD-9-CM: 250.40, 585.3       HTN.  Controlled.  RF hydralazine.  Continue BP meds.    CKD.  Stage 3.  Continue BP monitoring.  Continue renal following.  Dm2 controlled.  Tresiba samples given.        No orders of the defined types were placed in this encounter.      Current Outpatient Medications   Medication Sig Dispense Refill   • amLODIPine (NORVASC) 5 MG tablet Take 1 tablet by mouth Daily. 90 tablet 3   • carvedilol (COREG) 6.25 MG tablet Take 6.25 mg by mouth 2 (Two) Times a Day.     • cholecalciferol (VITAMIN D3) 25 MCG (1000 UT) tablet Take 1,000 Units by mouth 2 (two) times a day.     • haloperidol (HALDOL) 5 MG tablet Take 5 mg by mouth 2 (Two) Times a Day.  0   • hydrALAZINE (APRESOLINE) 100 MG tablet Take 1 tablet by mouth 2 (Two) Times a Day. 180 tablet 3   • insulin aspart (NovoLOG FlexPen) 100 UNIT/ML solution pen-injector sc pen Inject 4 Units under the skin into the appropriate area as directed 3 (Three) Times a Day With Meals. 5 pen 5   • insulin degludec (Tresiba FlexTouch) 100 UNIT/ML solution pen-injector injection Inject 18 Units under the skin into the appropriate area as directed Daily. 5 pen 5   • Insulin Pen Needle (Pen Needles 3/16\") 31G X 5 MM misc Inject insulin 4 times a day as directed 200 each 5   • lisinopril (PRINIVIL,ZESTRIL) 40 MG tablet Take 1 tablet by mouth Daily. 90 tablet 2   • nortriptyline (PAMELOR) 50 MG capsule Take 2 capsules by mouth Every Night.     • OneTouch Ultra " test strip USE TO CHECK 3 TIMES A DAY BEFORE MEALS 300 each 3   • pravastatin (PRAVACHOL) 20 MG tablet Take 1 tablet by mouth Daily. 90 tablet 3   • tamsulosin (FLOMAX) 0.4 MG capsule 24 hr capsule TAKE 1 CAPSULE BY MOUTH EVERY DAY 90 capsule 1   • topiramate (TOPAMAX) 25 MG tablet Take 2 tablets by mouth Every Night.     • calcitriol (ROCALTROL) 0.25 MCG capsule Take 1 capsule by mouth Every Other Day.     • sodium-potassium-magnesium sulfates (SUPREP) 17.5-3.13-1.6 GM/177ML solution oral solution Take 354 mL by mouth.     • Suprep Bowel Prep Kit 17.5-3.13-1.6 GM/177ML solution oral solution TAKE 354 MLS BY MOUTH ONCE FOR 1 DOSE.       No current facility-administered medications for this visit.       Krish Gloria had no medications administered during this visit.    No follow-ups on file.    There are no Patient Instructions on file for this visit.

## 2021-04-24 ENCOUNTER — IMMUNIZATION (OUTPATIENT)
Dept: VACCINE CLINIC | Facility: HOSPITAL | Age: 71
End: 2021-04-24

## 2021-04-24 PROCEDURE — 91301 HC SARSCO02 VAC 100MCG/0.5ML IM: CPT | Performed by: NURSE PRACTITIONER

## 2021-04-24 PROCEDURE — 0012A: CPT | Performed by: NURSE PRACTITIONER

## 2021-05-12 DIAGNOSIS — E11.42 TYPE 2 DIABETES MELLITUS WITH DIABETIC POLYNEUROPATHY, WITH LONG-TERM CURRENT USE OF INSULIN (HCC): ICD-10-CM

## 2021-05-12 DIAGNOSIS — Z79.4 TYPE 2 DIABETES MELLITUS WITH DIABETIC POLYNEUROPATHY, WITH LONG-TERM CURRENT USE OF INSULIN (HCC): ICD-10-CM

## 2021-05-12 RX ORDER — INSULIN DEGLUDEC INJECTION 100 U/ML
INJECTION, SOLUTION SUBCUTANEOUS
Qty: 15 PEN | Refills: 3 | Status: SHIPPED | OUTPATIENT
Start: 2021-05-12 | End: 2021-10-05 | Stop reason: SDUPTHER

## 2021-06-16 ENCOUNTER — OFFICE VISIT (OUTPATIENT)
Dept: FAMILY MEDICINE CLINIC | Facility: CLINIC | Age: 71
End: 2021-06-16

## 2021-06-16 VITALS
WEIGHT: 210 LBS | HEART RATE: 61 BPM | OXYGEN SATURATION: 98 % | HEIGHT: 72 IN | DIASTOLIC BLOOD PRESSURE: 62 MMHG | TEMPERATURE: 96.4 F | BODY MASS INDEX: 28.44 KG/M2 | SYSTOLIC BLOOD PRESSURE: 120 MMHG

## 2021-06-16 DIAGNOSIS — N40.0 BENIGN PROSTATIC HYPERPLASIA, UNSPECIFIED WHETHER LOWER URINARY TRACT SYMPTOMS PRESENT: ICD-10-CM

## 2021-06-16 DIAGNOSIS — Z79.4 TYPE 2 DIABETES MELLITUS WITH DIABETIC POLYNEUROPATHY, WITH LONG-TERM CURRENT USE OF INSULIN (HCC): ICD-10-CM

## 2021-06-16 DIAGNOSIS — I10 HYPERTENSION, ESSENTIAL: Primary | ICD-10-CM

## 2021-06-16 DIAGNOSIS — E11.42 TYPE 2 DIABETES MELLITUS WITH DIABETIC POLYNEUROPATHY, WITH LONG-TERM CURRENT USE OF INSULIN (HCC): ICD-10-CM

## 2021-06-16 DIAGNOSIS — E78.2 MIXED HYPERLIPIDEMIA: ICD-10-CM

## 2021-06-16 PROCEDURE — 99214 OFFICE O/P EST MOD 30 MIN: CPT | Performed by: FAMILY MEDICINE

## 2021-06-16 NOTE — PROGRESS NOTES
"Chief Complaint   Patient presents with   • Hypertension       Subjective   Krish Castro is a 71 y.o. male.     History of Present Illness   F/U HTN.  No orthostasis.  Doing well with meds.    F/U hyperlipidmeia.  LDL 78 8 months ago.    F/U DM2.   On insulin regulalry.  \"My sugar is up and down\".  Only one low to 62 due to missing a meal.    The following portions of the patient's history were reviewed and updated as appropriate: allergies, current medications, past family history, past medical history, past social history, past surgical history and problem list.    Review of Systems   Constitutional: Negative for appetite change and fatigue.   HENT: Negative for nosebleeds and sore throat.    Eyes: Negative for blurred vision and visual disturbance.   Respiratory: Negative for shortness of breath and wheezing.    Cardiovascular: Negative for chest pain and leg swelling.   Gastrointestinal: Negative for abdominal distention and abdominal pain.   Endocrine: Negative for cold intolerance and polyuria.   Genitourinary: Negative for dysuria and hematuria.   Musculoskeletal: Negative for arthralgias and myalgias.   Skin: Negative for color change and rash.   Neurological: Negative for weakness and confusion.   Psychiatric/Behavioral: Negative for agitation and depressed mood.       Patient Active Problem List   Diagnosis   • Seasonal allergic rhinitis due to pollen   • BPH (benign prostatic hyperplasia)   • Constipation   • Hypertension, essential   • Depression   • Diabetic peripheral neuropathy (CMS/[facility])   • Hyperlipidemia   • Type 2 diabetes mellitus with diabetic polyneuropathy, with long-term current use of insulin (CMS/[facility])   • Medicare annual wellness visit, subsequent   • CKD stage 3 secondary to diabetes (CMS/[facility])   • Thyroid disorder   • Vitamin D deficiency       Allergies   Allergen Reactions   • Hydrocodone Itching and Other (See Comments)     Headaches/trembles         Current Outpatient Medications:   •  " "amLODIPine (NORVASC) 5 MG tablet, Take 1 tablet by mouth Daily., Disp: 90 tablet, Rfl: 3  •  calcitriol (ROCALTROL) 0.25 MCG capsule, Take 1 capsule by mouth Every Other Day., Disp: , Rfl:   •  carvedilol (COREG) 6.25 MG tablet, Take 6.25 mg by mouth 2 (Two) Times a Day., Disp: , Rfl:   •  cholecalciferol (VITAMIN D3) 25 MCG (1000 UT) tablet, Take 1,000 Units by mouth 2 (two) times a day., Disp: , Rfl:   •  haloperidol (HALDOL) 5 MG tablet, Take 5 mg by mouth 2 (Two) Times a Day., Disp: , Rfl: 0  •  hydrALAZINE (APRESOLINE) 100 MG tablet, Take 1 tablet by mouth 2 (Two) Times a Day., Disp: 180 tablet, Rfl: 3  •  insulin aspart (NovoLOG FlexPen) 100 UNIT/ML solution pen-injector sc pen, Inject 4 Units under the skin into the appropriate area as directed 3 (Three) Times a Day With Meals., Disp: 5 pen, Rfl: 5  •  Insulin Pen Needle (Pen Needles 3/16\") 31G X 5 MM misc, Inject insulin 4 times a day as directed, Disp: 200 each, Rfl: 5  •  lisinopril (PRINIVIL,ZESTRIL) 40 MG tablet, Take 1 tablet by mouth Daily., Disp: 90 tablet, Rfl: 2  •  nortriptyline (PAMELOR) 50 MG capsule, Take 2 capsules by mouth Every Night., Disp: , Rfl:   •  OneTouch Ultra test strip, USE TO CHECK 3 TIMES A DAY BEFORE MEALS, Disp: 300 each, Rfl: 3  •  pravastatin (PRAVACHOL) 20 MG tablet, Take 1 tablet by mouth Daily., Disp: 90 tablet, Rfl: 3  •  tamsulosin (FLOMAX) 0.4 MG capsule 24 hr capsule, TAKE 1 CAPSULE BY MOUTH EVERY DAY, Disp: 90 capsule, Rfl: 1  •  topiramate (TOPAMAX) 25 MG tablet, Take 2 tablets by mouth Every Night., Disp: , Rfl:   •  Tresiba FlexTouch 100 UNIT/ML solution pen-injector injection, INJECT 18 UNITS UNDER THE SKIN INTO THE APPROPRIATE AREA AS DIRECTED DAILY., Disp: 15 pen, Rfl: 3    Past Medical History:   Diagnosis Date   • Allergic rhinitis    • BPH (benign prostatic hyperplasia)    • Chronic kidney disease (CKD) stage G2/A1, mildly decreased glomerular filtration rate (GFR) between 60-89 mL/min/1.73 square meter and " albuminuria creatinine ratio less than 30 mg/g    • Constipation    • Constipation, chronic    • Depression    • Diabetes mellitus (CMS/HCC)    • Diabetic peripheral neuropathy (CMS/HCC)    • Former smoker    • High blood pressure    • High cholesterol    • Hyperlipidemia    • Hypertension, essential    • Inflamed skin tag    • Medicare annual wellness visit, subsequent    • Prostate disease    • Rash    • Seasonal allergic rhinitis due to pollen    • Stage 2 chronic kidney disease    • Thyroid disorder    • Tinea cruris    • Type 1 diabetes mellitus (CMS/HCC)    • Vitamin D deficiency        Past Surgical History:   Procedure Laterality Date   • COLONOSCOPY  12/24/20, 11/2015    3 polyps,  2 polyps   • OTHER SURGICAL HISTORY      Sialolithotomy-Abstracted from [employer]       Family History   Problem Relation Age of Onset   • Hyperlipidemia Mother    • Colon cancer Mother    • Hypertension Mother    • Anxiety disorder Mother    • Heart disease Mother    • Diabetes type I Father    • Colon cancer Brother    • Diabetes type I Brother    • Heart disease Other         In females before age 65, and males before age 55       Social History     Tobacco Use   • Smoking status: Former Smoker     Packs/day: 1.00     Years: 5.00     Pack years: 5.00     Types: Cigarettes   • Smokeless tobacco: Never Used   • Tobacco comment: smoked less than 1 pack per day for less than 5 years   Substance Use Topics   • Alcohol use: Yes     Comment: drinks alcohol, 7 or less drinks per week            Objective     Vitals:    06/16/21 1456   BP: 120/62   Pulse: 61   Temp: 96.4 °F (35.8 °C)   SpO2: 98%     Body mass index is 28.48 kg/m².    Physical Exam  Vitals reviewed.   Constitutional:       Appearance: He is well-developed. He is not diaphoretic.   HENT:      Head: Normocephalic and atraumatic.   Eyes:      General: No scleral icterus.     Pupils: Pupils are equal, round, and reactive to light.   Neck:      Thyroid: No thyromegaly.    Cardiovascular:      Rate and Rhythm: Normal rate and regular rhythm.      Heart sounds: No murmur heard.   No friction rub. No gallop.    Pulmonary:      Effort: Pulmonary effort is normal. No respiratory distress.      Breath sounds: No wheezing or rales.   Chest:      Chest wall: No tenderness.   Abdominal:      General: Bowel sounds are normal. There is no distension.      Palpations: Abdomen is soft.      Tenderness: There is no abdominal tenderness.   Musculoskeletal:         General: No deformity. Normal range of motion.   Lymphadenopathy:      Cervical: No cervical adenopathy.   Skin:     General: Skin is warm and dry.      Findings: No rash.   Neurological:      Cranial Nerves: No cranial nerve deficit.      Motor: No abnormal muscle tone.         Lab Results   Component Value Date    BUN 17 03/16/2021    CREATININE 1.72 (H) 03/16/2021    EGFRIFNONA 39 (L) 03/16/2021    EGFRIFAFRI 48 (L) 03/16/2021    BCR 9.9 03/16/2021    K 5.3 (H) 03/16/2021    CO2 28.9 03/16/2021    CALCIUM 9.5 03/16/2021    PROTENTOTREF 6.3 03/16/2021    ALBUMIN 3.90 03/16/2021    LABIL2 1.6 03/16/2021    AST 11 03/16/2021    ALT 16 03/16/2021       No results found for: WBC, RBC, HGB, HCT, MCV, MCH, MCHC, RDW, RDWSD, MPV, PLT, NEUTRORELPCT, LYMPHORELPCT, MONORELPCT, EOSRELPCT, BASORELPCT, AUTOIGPER, NEUTROABS, LYMPHSABS, MONOSABS, EOSABS, BASOSABS, AUTOIGNUM, NRBC    Lab Results   Component Value Date    HGBA1C 6.60 (H) 03/16/2021       No results found for: [id number]    TSH   Date Value Ref Range Status   09/24/2019 2.640 0.270 - 4.200 uIU/mL Final       No results found for: CHOL  Lab Results   Component Value Date    TRIG 57 09/24/2020     Lab Results   Component Value Date    HDL 51 09/24/2020     Lab Results   Component Value Date    LDL 78 09/24/2020     Lab Results   Component Value Date    VLDL 11.4 09/24/2020     No results found for: LDLHDL      Procedures    Assessment/Plan   Problems Addressed this Visit      "Hyperlipidemia    Relevant Orders    Comprehensive Metabolic Panel    Hypertension, essential - Primary    Relevant Orders    Comprehensive Metabolic Panel    Type 2 diabetes mellitus with diabetic polyneuropathy, with long-term current use of insulin (CMS/[facility])    Relevant Orders    Comprehensive Metabolic Panel    Hemoglobin A1c      Diagnoses       Codes Comments    Hypertension, essential    -  Primary ICD-10-CM: I10  ICD-9-CM: 401.9     Type 2 diabetes mellitus with diabetic polyneuropathy, with long-term current use of insulin (CMS/[facility])     ICD-10-CM: E11.42, Z79.4  ICD-9-CM: 250.60, 357.2, V58.67     Mixed hyperlipidemia     ICD-10-CM: E78.2  ICD-9-CM: 272.2         Hyperlipidemia.  LDL at goal 8 months ago.  continue statin tx.    DM2.  Continue insulin.  Check A1c.  Samples of tresiba given.    HTN.   No orthostasis.  Doing well with med.    Orders Placed This Encounter   Procedures   • Comprehensive Metabolic Panel     Order Specific Question:   Release to patient     Answer:   Immediate   • Hemoglobin A1c     Order Specific Question:   Release to patient     Answer:   Immediate       Current Outpatient Medications   Medication Sig Dispense Refill   • amLODIPine (NORVASC) 5 MG tablet Take 1 tablet by mouth Daily. 90 tablet 3   • calcitriol (ROCALTROL) 0.25 MCG capsule Take 1 capsule by mouth Every Other Day.     • carvedilol (COREG) 6.25 MG tablet Take 6.25 mg by mouth 2 (Two) Times a Day.     • cholecalciferol (VITAMIN D3) 25 MCG (1000 UT) tablet Take 1,000 Units by mouth 2 (two) times a day.     • haloperidol (HALDOL) 5 MG tablet Take 5 mg by mouth 2 (Two) Times a Day.  0   • hydrALAZINE (APRESOLINE) 100 MG tablet Take 1 tablet by mouth 2 (Two) Times a Day. 180 tablet 3   • insulin aspart (NovoLOG FlexPen) 100 UNIT/ML solution pen-injector sc pen Inject 4 Units under the skin into the appropriate area as directed 3 (Three) Times a Day With Meals. 5 pen 5   • Insulin Pen Needle (Pen Needles 3/16\") 31G X 5 " MM misc Inject insulin 4 times a day as directed 200 each 5   • lisinopril (PRINIVIL,ZESTRIL) 40 MG tablet Take 1 tablet by mouth Daily. 90 tablet 2   • nortriptyline (PAMELOR) 50 MG capsule Take 2 capsules by mouth Every Night.     • OneTouch Ultra test strip USE TO CHECK 3 TIMES A DAY BEFORE MEALS 300 each 3   • pravastatin (PRAVACHOL) 20 MG tablet Take 1 tablet by mouth Daily. 90 tablet 3   • tamsulosin (FLOMAX) 0.4 MG capsule 24 hr capsule TAKE 1 CAPSULE BY MOUTH EVERY DAY 90 capsule 1   • topiramate (TOPAMAX) 25 MG tablet Take 2 tablets by mouth Every Night.     • Tresiba FlexTouch 100 UNIT/ML solution pen-injector injection INJECT 18 UNITS UNDER THE SKIN INTO THE APPROPRIATE AREA AS DIRECTED DAILY. 15 pen 3     No current facility-administered medications for this visit.       Krish Castro had no medications administered during this visit.    No follow-ups on file.    There are no Patient Instructions on file for this visit.

## 2021-06-17 LAB
ALBUMIN SERPL-MCNC: 4 G/DL (ref 3.5–5.2)
ALBUMIN/GLOB SERPL: 1.9 G/DL
ALP SERPL-CCNC: 109 U/L (ref 39–117)
ALT SERPL-CCNC: 16 U/L (ref 1–41)
AST SERPL-CCNC: 5 U/L (ref 1–40)
BILIRUB SERPL-MCNC: 0.5 MG/DL (ref 0–1.2)
BUN SERPL-MCNC: 17 MG/DL (ref 8–23)
BUN/CREAT SERPL: 10.1 (ref 7–25)
CALCIUM SERPL-MCNC: 9.2 MG/DL (ref 8.6–10.5)
CHLORIDE SERPL-SCNC: 105 MMOL/L (ref 98–107)
CO2 SERPL-SCNC: 27.2 MMOL/L (ref 22–29)
CREAT SERPL-MCNC: 1.69 MG/DL (ref 0.76–1.27)
GLOBULIN SER CALC-MCNC: 2.1 GM/DL
GLUCOSE SERPL-MCNC: 91 MG/DL (ref 65–99)
HBA1C MFR BLD: 5.9 % (ref 4.8–5.6)
POTASSIUM SERPL-SCNC: 4.4 MMOL/L (ref 3.5–5.2)
PROT SERPL-MCNC: 6.1 G/DL (ref 6–8.5)
PSA SERPL-MCNC: 0.73 NG/ML (ref 0–4)
SODIUM SERPL-SCNC: 139 MMOL/L (ref 136–145)

## 2021-08-30 ENCOUNTER — TELEPHONE (OUTPATIENT)
Dept: FAMILY MEDICINE CLINIC | Facility: CLINIC | Age: 71
End: 2021-08-30

## 2021-09-14 DIAGNOSIS — I10 HYPERTENSION, ESSENTIAL: ICD-10-CM

## 2021-09-14 RX ORDER — LISINOPRIL 40 MG/1
TABLET ORAL
Qty: 90 TABLET | Refills: 2 | Status: SHIPPED | OUTPATIENT
Start: 2021-09-14 | End: 2022-06-13

## 2021-10-05 ENCOUNTER — OFFICE VISIT (OUTPATIENT)
Dept: FAMILY MEDICINE CLINIC | Facility: CLINIC | Age: 71
End: 2021-10-05

## 2021-10-05 VITALS
HEART RATE: 62 BPM | DIASTOLIC BLOOD PRESSURE: 64 MMHG | BODY MASS INDEX: 28.44 KG/M2 | OXYGEN SATURATION: 98 % | SYSTOLIC BLOOD PRESSURE: 110 MMHG | TEMPERATURE: 98.2 F | HEIGHT: 72 IN | WEIGHT: 210 LBS

## 2021-10-05 DIAGNOSIS — I10 HYPERTENSION, ESSENTIAL: ICD-10-CM

## 2021-10-05 DIAGNOSIS — E11.42 TYPE 2 DIABETES MELLITUS WITH DIABETIC POLYNEUROPATHY, WITH LONG-TERM CURRENT USE OF INSULIN (HCC): Primary | ICD-10-CM

## 2021-10-05 DIAGNOSIS — Z79.4 TYPE 2 DIABETES MELLITUS WITH DIABETIC POLYNEUROPATHY, WITH LONG-TERM CURRENT USE OF INSULIN (HCC): Primary | ICD-10-CM

## 2021-10-05 DIAGNOSIS — E78.2 MIXED HYPERLIPIDEMIA: ICD-10-CM

## 2021-10-05 PROCEDURE — 90662 IIV NO PRSV INCREASED AG IM: CPT | Performed by: FAMILY MEDICINE

## 2021-10-05 PROCEDURE — 99214 OFFICE O/P EST MOD 30 MIN: CPT | Performed by: FAMILY MEDICINE

## 2021-10-05 PROCEDURE — G0008 ADMIN INFLUENZA VIRUS VAC: HCPCS | Performed by: FAMILY MEDICINE

## 2021-10-05 RX ORDER — INSULIN DEGLUDEC INJECTION 100 U/ML
INJECTION, SOLUTION SUBCUTANEOUS
Qty: 15 PEN | Refills: 3 | Status: SHIPPED | OUTPATIENT
Start: 2021-10-05 | End: 2022-01-20 | Stop reason: SDUPTHER

## 2021-10-05 NOTE — PROGRESS NOTES
Chief Complaint   Patient presents with   • Hypertension       Subjective   Krish aCstro is a 71 y.o. male.     History of Present Illness   F/U DM2.  BS running .  On insulin regulalry.    F/U HTN.  No orthostasis.  Doing well with meds.    The following portions of the patient's history were reviewed and updated as appropriate: allergies, current medications, past family history, past medical history, past social history, past surgical history and problem list.    Review of Systems   Constitutional: Negative for appetite change and fatigue.   HENT: Negative for nosebleeds and sore throat.    Eyes: Negative for blurred vision and visual disturbance.   Respiratory: Negative for shortness of breath and wheezing.    Cardiovascular: Negative for chest pain and leg swelling.   Gastrointestinal: Negative for abdominal distention and abdominal pain.   Endocrine: Negative for cold intolerance and polyuria.   Genitourinary: Negative for dysuria and hematuria.   Musculoskeletal: Negative for arthralgias and myalgias.   Skin: Negative for color change and rash.   Neurological: Negative for weakness and confusion.   Psychiatric/Behavioral: Negative for agitation and depressed mood.       Patient Active Problem List   Diagnosis   • Seasonal allergic rhinitis due to pollen   • BPH (benign prostatic hyperplasia)   • Constipation   • Hypertension, essential   • Depression   • Diabetic peripheral neuropathy (HCC)   • Hyperlipidemia   • Type 2 diabetes mellitus with diabetic polyneuropathy, with long-term current use of insulin (HCC)   • Medicare annual wellness visit, subsequent   • CKD stage 3 secondary to diabetes (HCC)   • Thyroid disorder   • Vitamin D deficiency       Allergies   Allergen Reactions   • Hydrocodone Itching and Other (See Comments)     Headaches/trembles         Current Outpatient Medications:   •  amLODIPine (NORVASC) 5 MG tablet, Take 1 tablet by mouth Daily., Disp: 90 tablet, Rfl: 3  •  calcitriol  "(ROCALTROL) 0.25 MCG capsule, Take 1 capsule by mouth Every Other Day., Disp: , Rfl:   •  carvedilol (COREG) 6.25 MG tablet, Take 6.25 mg by mouth 2 (Two) Times a Day., Disp: , Rfl:   •  cholecalciferol (VITAMIN D3) 25 MCG (1000 UT) tablet, Take 1,000 Units by mouth 2 (two) times a day., Disp: , Rfl:   •  haloperidol (HALDOL) 5 MG tablet, Take 5 mg by mouth 2 (Two) Times a Day., Disp: , Rfl: 0  •  hydrALAZINE (APRESOLINE) 100 MG tablet, Take 1 tablet by mouth 2 (Two) Times a Day., Disp: 180 tablet, Rfl: 3  •  insulin aspart (NovoLOG FlexPen) 100 UNIT/ML solution pen-injector sc pen, Inject 4 Units under the skin into the appropriate area as directed 3 (Three) Times a Day With Meals., Disp: 5 pen, Rfl: 5  •  insulin degludec (Tresiba FlexTouch) 100 UNIT/ML solution pen-injector injection, 18 units SQ a day, Disp: 15 pen, Rfl: 3  •  Insulin Pen Needle (Pen Needles 3/16\") 31G X 5 MM misc, Inject insulin 4 times a day as directed, Disp: 200 each, Rfl: 5  •  lisinopril (PRINIVIL,ZESTRIL) 40 MG tablet, TAKE 1 TABLET BY MOUTH EVERY DAY, Disp: 90 tablet, Rfl: 2  •  nortriptyline (PAMELOR) 50 MG capsule, Take 2 capsules by mouth Every Night., Disp: , Rfl:   •  OneTouch Ultra test strip, USE TO CHECK 3 TIMES A DAY BEFORE MEALS, Disp: 300 each, Rfl: 3  •  pravastatin (PRAVACHOL) 20 MG tablet, Take 1 tablet by mouth Daily., Disp: 90 tablet, Rfl: 3  •  tamsulosin (FLOMAX) 0.4 MG capsule 24 hr capsule, TAKE 1 CAPSULE BY MOUTH EVERY DAY, Disp: 90 capsule, Rfl: 1  •  topiramate (TOPAMAX) 25 MG tablet, Take 2 tablets by mouth Every Night., Disp: , Rfl:     Past Medical History:   Diagnosis Date   • Allergic rhinitis    • BPH (benign prostatic hyperplasia)    • Chronic kidney disease (CKD) stage G2/A1, mildly decreased glomerular filtration rate (GFR) between 60-89 mL/min/1.73 square meter and albuminuria creatinine ratio less than 30 mg/g    • Constipation    • Constipation, chronic    • Depression    • Diabetes mellitus (HCC)    • " Diabetic peripheral neuropathy (HCC)    • Former smoker    • High blood pressure    • High cholesterol    • Hyperlipidemia    • Hypertension, essential    • Inflamed skin tag    • Medicare annual wellness visit, subsequent    • Prostate disease    • Rash    • Seasonal allergic rhinitis due to pollen    • Stage 2 chronic kidney disease    • Thyroid disorder    • Tinea cruris    • Type 1 diabetes mellitus (HCC)    • Vitamin D deficiency        Past Surgical History:   Procedure Laterality Date   • COLONOSCOPY  12/24/20, 11/2015    3 polyps,  2 polyps   • OTHER SURGICAL HISTORY      Sialolithotomy-Abstracted from [employer]       Family History   Problem Relation Age of Onset   • Hyperlipidemia Mother    • Colon cancer Mother    • Hypertension Mother    • Anxiety disorder Mother    • Heart disease Mother    • Diabetes type I Father    • Colon cancer Brother    • Diabetes type I Brother    • Heart disease Other         In females before age 65, and males before age 55       Social History     Tobacco Use   • Smoking status: Former Smoker     Packs/day: 1.00     Years: 5.00     Pack years: 5.00     Types: Cigarettes   • Smokeless tobacco: Never Used   • Tobacco comment: smoked less than 1 pack per day for less than 5 years   Substance Use Topics   • Alcohol use: Yes     Comment: drinks alcohol, 7 or less drinks per week            Objective     Vitals:    10/05/21 1450   BP: 110/64   Pulse: 62   Temp: 98.2 °F (36.8 °C)   SpO2: 98%     Body mass index is 28.48 kg/m².    Physical Exam  Vitals reviewed.   Constitutional:       Appearance: He is well-developed. He is not diaphoretic.   HENT:      Head: Normocephalic and atraumatic.   Eyes:      General: No scleral icterus.     Pupils: Pupils are equal, round, and reactive to light.   Neck:      Thyroid: No thyromegaly.   Cardiovascular:      Rate and Rhythm: Normal rate and regular rhythm.      Heart sounds: No murmur heard.   No friction rub. No gallop.    Pulmonary:       Effort: Pulmonary effort is normal. No respiratory distress.      Breath sounds: No wheezing or rales.   Chest:      Chest wall: No tenderness.   Abdominal:      General: Bowel sounds are normal. There is no distension.      Palpations: Abdomen is soft.      Tenderness: There is no abdominal tenderness.   Musculoskeletal:         General: No deformity. Normal range of motion.   Lymphadenopathy:      Cervical: No cervical adenopathy.   Skin:     General: Skin is warm and dry.      Findings: No rash.   Neurological:      Cranial Nerves: No cranial nerve deficit.      Motor: No abnormal muscle tone.         Lab Results   Component Value Date    BUN 17 06/16/2021    CREATININE 1.69 (H) 06/16/2021    EGFRIFNONA 40 (L) 06/16/2021    EGFRIFAFRI 49 (L) 06/16/2021    BCR 10.1 06/16/2021    K 4.4 06/16/2021    CO2 27.2 06/16/2021    CALCIUM 9.2 06/16/2021    PROTENTOTREF 6.1 06/16/2021    ALBUMIN 4.00 06/16/2021    LABIL2 1.9 06/16/2021    AST 5 06/16/2021    ALT 16 06/16/2021       No results found for: WBC, RBC, HGB, HCT, MCV, MCH, MCHC, RDW, RDWSD, MPV, PLT, NEUTRORELPCT, LYMPHORELPCT, MONORELPCT, EOSRELPCT, BASORELPCT, AUTOIGPER, NEUTROABS, LYMPHSABS, MONOSABS, EOSABS, BASOSABS, AUTOIGNUM, NRBC    Lab Results   Component Value Date    HGBA1C 5.90 (H) 06/16/2021       No results found for: [id number]    TSH   Date Value Ref Range Status   09/24/2019 2.640 0.270 - 4.200 uIU/mL Final       No results found for: CHOL  Lab Results   Component Value Date    TRIG 57 09/24/2020     Lab Results   Component Value Date    HDL 51 09/24/2020     Lab Results   Component Value Date    LDL 78 09/24/2020     Lab Results   Component Value Date    VLDL 11.4 09/24/2020     No results found for: LDLHDL      Procedures    Assessment/Plan   Problems Addressed this Visit     Hyperlipidemia    Relevant Orders    Lipid Panel With / Chol / HDL Ratio    Hypertension, essential    Type 2 diabetes mellitus with diabetic polyneuropathy, with long-term  current use of insulin (HCC) - Primary    Relevant Medications    insulin degludec (Tresiba FlexTouch) 100 UNIT/ML solution pen-injector injection    Other Relevant Orders    Comprehensive Metabolic Panel    Hemoglobin A1c    Lipid Panel With / Chol / HDL Ratio    Vitamin B12      Diagnoses       Codes Comments    Type 2 diabetes mellitus with diabetic polyneuropathy, with long-term current use of insulin (HCC)    -  Primary ICD-10-CM: E11.42, Z79.4  ICD-9-CM: 250.60, 357.2, V58.67     Mixed hyperlipidemia     ICD-10-CM: E78.2  ICD-9-CM: 272.2     Hypertension, essential     ICD-10-CM: I10  ICD-9-CM: 401.9       DM2.  Good control per pt.  Continue meds.  Check A1c. RF tresiba.  HTN.  Controlle.d  Continue meds.  Check CMP.    Hyperlipidmeia.  Check FLP.  Continue pravastatin.      Orders Placed This Encounter   Procedures   • Fluzone High-Dose 65+yrs (0383-5705)   • Comprehensive Metabolic Panel     Order Specific Question:   Release to patient     Answer:   Immediate   • Hemoglobin A1c     Order Specific Question:   Release to patient     Answer:   Immediate   • Lipid Panel With / Chol / HDL Ratio     Order Specific Question:   Release to patient     Answer:   Immediate   • Vitamin B12     Order Specific Question:   Release to patient     Answer:   Immediate       Current Outpatient Medications   Medication Sig Dispense Refill   • amLODIPine (NORVASC) 5 MG tablet Take 1 tablet by mouth Daily. 90 tablet 3   • calcitriol (ROCALTROL) 0.25 MCG capsule Take 1 capsule by mouth Every Other Day.     • carvedilol (COREG) 6.25 MG tablet Take 6.25 mg by mouth 2 (Two) Times a Day.     • cholecalciferol (VITAMIN D3) 25 MCG (1000 UT) tablet Take 1,000 Units by mouth 2 (two) times a day.     • haloperidol (HALDOL) 5 MG tablet Take 5 mg by mouth 2 (Two) Times a Day.  0   • hydrALAZINE (APRESOLINE) 100 MG tablet Take 1 tablet by mouth 2 (Two) Times a Day. 180 tablet 3   • insulin aspart (NovoLOG FlexPen) 100 UNIT/ML solution  "pen-injector sc pen Inject 4 Units under the skin into the appropriate area as directed 3 (Three) Times a Day With Meals. 5 pen 5   • insulin degludec (Tresiba FlexTouch) 100 UNIT/ML solution pen-injector injection 18 units SQ a day 15 pen 3   • Insulin Pen Needle (Pen Needles 3/16\") 31G X 5 MM misc Inject insulin 4 times a day as directed 200 each 5   • lisinopril (PRINIVIL,ZESTRIL) 40 MG tablet TAKE 1 TABLET BY MOUTH EVERY DAY 90 tablet 2   • nortriptyline (PAMELOR) 50 MG capsule Take 2 capsules by mouth Every Night.     • OneTouch Ultra test strip USE TO CHECK 3 TIMES A DAY BEFORE MEALS 300 each 3   • pravastatin (PRAVACHOL) 20 MG tablet Take 1 tablet by mouth Daily. 90 tablet 3   • tamsulosin (FLOMAX) 0.4 MG capsule 24 hr capsule TAKE 1 CAPSULE BY MOUTH EVERY DAY 90 capsule 1   • topiramate (TOPAMAX) 25 MG tablet Take 2 tablets by mouth Every Night.       No current facility-administered medications for this visit.       Krish Castro had no medications administered during this visit.    No follow-ups on file.    There are no Patient Instructions on file for this visit.       "

## 2021-10-06 PROBLEM — E53.8 B12 DEFICIENCY: Status: ACTIVE | Noted: 2021-10-06

## 2021-10-06 LAB
ALBUMIN SERPL-MCNC: 4.1 G/DL (ref 3.5–5.2)
ALBUMIN/GLOB SERPL: 2.1 G/DL
ALP SERPL-CCNC: 107 U/L (ref 39–117)
ALT SERPL-CCNC: 18 U/L (ref 1–41)
AST SERPL-CCNC: 9 U/L (ref 1–40)
BILIRUB SERPL-MCNC: 0.4 MG/DL (ref 0–1.2)
BUN SERPL-MCNC: 19 MG/DL (ref 8–23)
BUN/CREAT SERPL: 12.1 (ref 7–25)
CALCIUM SERPL-MCNC: 9.1 MG/DL (ref 8.6–10.5)
CHLORIDE SERPL-SCNC: 106 MMOL/L (ref 98–107)
CHOLEST SERPL-MCNC: 125 MG/DL (ref 0–200)
CHOLEST/HDLC SERPL: 2.5 {RATIO}
CO2 SERPL-SCNC: 25.3 MMOL/L (ref 22–29)
CREAT SERPL-MCNC: 1.57 MG/DL (ref 0.76–1.27)
GLOBULIN SER CALC-MCNC: 2 GM/DL
GLUCOSE SERPL-MCNC: 151 MG/DL (ref 65–99)
HBA1C MFR BLD: 5.8 % (ref 4.8–5.6)
HDLC SERPL-MCNC: 50 MG/DL (ref 40–60)
LDLC SERPL CALC-MCNC: 62 MG/DL (ref 0–100)
POTASSIUM SERPL-SCNC: 4.5 MMOL/L (ref 3.5–5.2)
PROT SERPL-MCNC: 6.1 G/DL (ref 6–8.5)
SODIUM SERPL-SCNC: 138 MMOL/L (ref 136–145)
TRIGL SERPL-MCNC: 63 MG/DL (ref 0–150)
VIT B12 SERPL-MCNC: 281 PG/ML (ref 211–946)
VLDLC SERPL CALC-MCNC: 13 MG/DL (ref 5–40)

## 2021-10-16 DIAGNOSIS — N40.0 BENIGN PROSTATIC HYPERPLASIA, UNSPECIFIED WHETHER LOWER URINARY TRACT SYMPTOMS PRESENT: ICD-10-CM

## 2021-10-18 RX ORDER — TAMSULOSIN HYDROCHLORIDE 0.4 MG/1
CAPSULE ORAL
Qty: 90 CAPSULE | Refills: 3 | Status: SHIPPED | OUTPATIENT
Start: 2021-10-18 | End: 2022-10-19

## 2021-12-22 ENCOUNTER — TELEPHONE (OUTPATIENT)
Dept: FAMILY MEDICINE CLINIC | Facility: CLINIC | Age: 71
End: 2021-12-22

## 2022-01-20 ENCOUNTER — OFFICE VISIT (OUTPATIENT)
Dept: FAMILY MEDICINE CLINIC | Facility: CLINIC | Age: 72
End: 2022-01-20

## 2022-01-20 VITALS
SYSTOLIC BLOOD PRESSURE: 132 MMHG | WEIGHT: 202 LBS | HEART RATE: 58 BPM | BODY MASS INDEX: 27.36 KG/M2 | TEMPERATURE: 97.7 F | DIASTOLIC BLOOD PRESSURE: 58 MMHG | HEIGHT: 72 IN | OXYGEN SATURATION: 99 %

## 2022-01-20 DIAGNOSIS — Z79.4 TYPE 2 DIABETES MELLITUS WITH DIABETIC POLYNEUROPATHY, WITH LONG-TERM CURRENT USE OF INSULIN: ICD-10-CM

## 2022-01-20 DIAGNOSIS — E11.42 TYPE 2 DIABETES MELLITUS WITH DIABETIC POLYNEUROPATHY, WITH LONG-TERM CURRENT USE OF INSULIN: ICD-10-CM

## 2022-01-20 DIAGNOSIS — Z11.59 ENCOUNTER FOR HEPATITIS C SCREENING TEST FOR LOW RISK PATIENT: ICD-10-CM

## 2022-01-20 DIAGNOSIS — F32.A DEPRESSION, UNSPECIFIED DEPRESSION TYPE: Primary | ICD-10-CM

## 2022-01-20 DIAGNOSIS — E53.8 B12 DEFICIENCY: ICD-10-CM

## 2022-01-20 DIAGNOSIS — I10 HYPERTENSION, ESSENTIAL: ICD-10-CM

## 2022-01-20 PROCEDURE — 99214 OFFICE O/P EST MOD 30 MIN: CPT | Performed by: FAMILY MEDICINE

## 2022-01-20 RX ORDER — INSULIN DEGLUDEC INJECTION 100 U/ML
INJECTION, SOLUTION SUBCUTANEOUS
Qty: 5 PEN | Refills: 5 | Status: SHIPPED | OUTPATIENT
Start: 2022-01-20

## 2022-01-20 NOTE — PROGRESS NOTES
Chief Complaint   Patient presents with   • Diabetes       Subjective   Krish Castro is a 71 y.o. male.     History of Present Illness   F/U HTN.  No orhtostasis.  Doing well with meds.    F/U DM2.  BS running 120-170s. Needs refill on tresiba.  Riding bike now.    The following portions of the patient's history were reviewed and updated as appropriate: allergies, current medications, past family history, past medical history, past social history, past surgical history and problem list.    Review of Systems   Constitutional: Negative for appetite change and fatigue.   HENT: Negative for nosebleeds and sore throat.    Eyes: Negative for blurred vision and visual disturbance.   Respiratory: Negative for shortness of breath and wheezing.    Cardiovascular: Negative for chest pain and leg swelling.   Gastrointestinal: Negative for abdominal distention and abdominal pain.   Endocrine: Negative for cold intolerance and polyuria.   Genitourinary: Negative for dysuria and hematuria.   Musculoskeletal: Negative for arthralgias and myalgias.   Skin: Negative for color change and rash.   Neurological: Negative for weakness and confusion.   Psychiatric/Behavioral: Negative for agitation and depressed mood.       Patient Active Problem List   Diagnosis   • Seasonal allergic rhinitis due to pollen   • BPH (benign prostatic hyperplasia)   • Constipation   • Hypertension, essential   • Depression   • Diabetic peripheral neuropathy (HCC)   • Hyperlipidemia   • Type 2 diabetes mellitus with diabetic polyneuropathy, with long-term current use of insulin (HCC)   • Medicare annual wellness visit, subsequent   • CKD stage 3 secondary to diabetes (HCC)   • Thyroid disorder   • Vitamin D deficiency   • B12 deficiency   • Encounter for hepatitis C screening test for low risk patient       Allergies   Allergen Reactions   • Hydrocodone Itching and Other (See Comments)     Headaches/trembles         Current Outpatient Medications:   •   "amLODIPine (NORVASC) 5 MG tablet, Take 1 tablet by mouth Daily., Disp: 90 tablet, Rfl: 3  •  calcitriol (ROCALTROL) 0.25 MCG capsule, Take 1 capsule by mouth Every Other Day., Disp: , Rfl:   •  carvedilol (COREG) 6.25 MG tablet, Take 6.25 mg by mouth 2 (Two) Times a Day., Disp: , Rfl:   •  cholecalciferol (VITAMIN D3) 25 MCG (1000 UT) tablet, Take 1,000 Units by mouth 2 (two) times a day., Disp: , Rfl:   •  cyanocobalamin (VITAMIN B-12) 1000 MCG tablet, Take 1,000 mcg by mouth., Disp: , Rfl:   •  haloperidol (HALDOL) 5 MG tablet, Take 5 mg by mouth 2 (Two) Times a Day., Disp: , Rfl: 0  •  hydrALAZINE (APRESOLINE) 100 MG tablet, Take 1 tablet by mouth 2 (Two) Times a Day., Disp: 180 tablet, Rfl: 3  •  insulin aspart (NovoLOG FlexPen) 100 UNIT/ML solution pen-injector sc pen, Inject 4 Units under the skin into the appropriate area as directed 3 (Three) Times a Day With Meals., Disp: 5 pen, Rfl: 5  •  insulin degludec (Tresiba FlexTouch) 100 UNIT/ML solution pen-injector injection, 18 units SQ a day, Disp: 5 pen, Rfl: 5  •  Insulin Pen Needle (Pen Needles 3/16\") 31G X 5 MM misc, Inject insulin 4 times a day as directed, Disp: 200 each, Rfl: 5  •  lisinopril (PRINIVIL,ZESTRIL) 40 MG tablet, TAKE 1 TABLET BY MOUTH EVERY DAY, Disp: 90 tablet, Rfl: 2  •  nortriptyline (PAMELOR) 50 MG capsule, Take 2 capsules by mouth Every Night., Disp: , Rfl:   •  OneTouch Ultra test strip, USE TO CHECK 3 TIMES A DAY BEFORE MEALS, Disp: 300 each, Rfl: 3  •  pravastatin (PRAVACHOL) 20 MG tablet, Take 1 tablet by mouth Daily., Disp: 90 tablet, Rfl: 3  •  tamsulosin (FLOMAX) 0.4 MG capsule 24 hr capsule, TAKE 1 CAPSULE BY MOUTH EVERY DAY, Disp: 90 capsule, Rfl: 3  •  topiramate (TOPAMAX) 25 MG tablet, Take 2 tablets by mouth Every Night., Disp: , Rfl:     Past Medical History:   Diagnosis Date   • Allergic rhinitis    • BPH (benign prostatic hyperplasia)    • Chronic kidney disease (CKD) stage G2/A1, mildly decreased glomerular filtration " rate (GFR) between 60-89 mL/min/1.73 square meter and albuminuria creatinine ratio less than 30 mg/g    • Constipation    • Constipation, chronic    • Depression    • Diabetes mellitus (HCC)    • Diabetic peripheral neuropathy (HCC)    • Former smoker    • High blood pressure    • High cholesterol    • Hyperlipidemia    • Hypertension, essential    • Inflamed skin tag    • Medicare annual wellness visit, subsequent    • Prostate disease    • Rash    • Seasonal allergic rhinitis due to pollen    • Stage 2 chronic kidney disease    • Thyroid disorder    • Tinea cruris    • Type 1 diabetes mellitus (HCC)    • Vitamin D deficiency        Past Surgical History:   Procedure Laterality Date   • COLONOSCOPY  12/24/20, 11/2015    3 polyps,  2 polyps   • OTHER SURGICAL HISTORY      Sialolithotomy-Abstracted from [facility]       Family History   Problem Relation Age of Onset   • Hyperlipidemia Mother    • Colon cancer Mother    • Hypertension Mother    • Anxiety disorder Mother    • Heart disease Mother    • Diabetes type I Father    • Colon cancer Brother    • Diabetes type I Brother    • Heart disease Other         In females before age 65, and males before age 55       Social History     Tobacco Use   • Smoking status: Former Smoker     Packs/day: 1.00     Years: 5.00     Pack years: 5.00     Types: Cigarettes   • Smokeless tobacco: Never Used   • Tobacco comment: smoked less than 1 pack per day for less than 5 years   Substance Use Topics   • Alcohol use: Yes     Comment: drinks alcohol, 7 or less drinks per week            Objective     Vitals:    01/20/22 1330   BP: 132/58   Pulse: 58   Temp: 97.7 °F (36.5 °C)   SpO2: 99%     Body mass index is 27.4 kg/m².    Physical Exam  Vitals reviewed.   Constitutional:       Appearance: He is well-developed. He is not diaphoretic.   HENT:      Head: Normocephalic and atraumatic.   Eyes:      General: No scleral icterus.     Pupils: Pupils are equal, round, and reactive to light.    Neck:      Thyroid: No thyromegaly.   Cardiovascular:      Rate and Rhythm: Normal rate and regular rhythm.      Heart sounds: No murmur heard.  No friction rub. No gallop.    Pulmonary:      Effort: Pulmonary effort is normal. No respiratory distress.      Breath sounds: No wheezing or rales.   Chest:      Chest wall: No tenderness.   Abdominal:      General: Bowel sounds are normal. There is no distension.      Palpations: Abdomen is soft.      Tenderness: There is no abdominal tenderness.   Musculoskeletal:         General: No deformity. Normal range of motion.   Lymphadenopathy:      Cervical: No cervical adenopathy.   Skin:     General: Skin is warm and dry.      Findings: No rash.   Neurological:      Cranial Nerves: No cranial nerve deficit.      Motor: No abnormal muscle tone.         Lab Results   Component Value Date    GLUCOSE 151 (H) 10/05/2021    BUN 19 10/05/2021    CREATININE 1.57 (H) 10/05/2021    EGFRIFNONA 44 (L) 10/05/2021    EGFRIFAFRI 53 (L) 10/05/2021    BCR 12.1 10/05/2021    K 4.5 10/05/2021    CO2 25.3 10/05/2021    CALCIUM 9.1 10/05/2021    PROTENTOTREF 6.1 10/05/2021    ALBUMIN 4.10 10/05/2021    LABIL2 2.1 10/05/2021    AST 9 10/05/2021    ALT 18 10/05/2021       No results found for: WBC, RBC, HGB, HCT, MCV, MCH, MCHC, RDW, RDWSD, MPV, PLT, NEUTRORELPCT, LYMPHORELPCT, MONORELPCT, EOSRELPCT, BASORELPCT, AUTOIGPER, NEUTROABS, LYMPHSABS, MONOSABS, EOSABS, BASOSABS, AUTOIGNUM, NRBC    Lab Results   Component Value Date    HGBA1C 5.80 (H) 10/05/2021       Lab Results   Component Value Date    WRZFPEUN49 281 10/05/2021       TSH   Date Value Ref Range Status   09/24/2019 2.640 0.270 - 4.200 uIU/mL Final       No results found for: CHOL  Lab Results   Component Value Date    TRIG 63 10/05/2021     Lab Results   Component Value Date    HDL 50 10/05/2021     Lab Results   Component Value Date    LDL 62 10/05/2021     Lab Results   Component Value Date    VLDL 13 10/05/2021     No results found  for: LDLHDL      Procedures    Assessment/Plan   Problems Addressed this Visit     B12 deficiency    Relevant Orders    Vitamin B12    Depression - Primary    Relevant Orders    Ambulatory Referral to Psychiatry (Completed)    Encounter for hepatitis C screening test for low risk patient    Relevant Orders    Hepatitis C Antibody    Hypertension, essential    Type 2 diabetes mellitus with diabetic polyneuropathy, with long-term current use of insulin (HCC)    Relevant Medications    insulin degludec (Tresiba FlexTouch) 100 UNIT/ML solution pen-injector injection    Other Relevant Orders    Comprehensive Metabolic Panel    Hemoglobin A1c      Diagnoses       Codes Comments    Depression, unspecified depression type    -  Primary ICD-10-CM: F32.A  ICD-9-CM: 311     Type 2 diabetes mellitus with diabetic polyneuropathy, with long-term current use of insulin (HCC)     ICD-10-CM: E11.42, Z79.4  ICD-9-CM: 250.60, 357.2, V58.67     B12 deficiency     ICD-10-CM: E53.8  ICD-9-CM: 266.2     Hypertension, essential     ICD-10-CM: I10  ICD-9-CM: 401.9     Encounter for hepatitis C screening test for low risk patient     ICD-10-CM: Z11.59  ICD-9-CM: V73.89       HTN.  Controlled. Check CMP.    DM2.  Contorlled.  Check A1c. RF tresiba.     B12 level.   Check B12 level.   Continue B12 1000 a day.       Orders Placed This Encounter   Procedures   • Hepatitis C Antibody     Order Specific Question:   Release to patient     Answer:   Immediate   • Vitamin B12     Order Specific Question:   Release to patient     Answer:   Immediate   • Comprehensive Metabolic Panel     Order Specific Question:   Release to patient     Answer:   Immediate   • Hemoglobin A1c     Order Specific Question:   Release to patient     Answer:   Immediate   • Ambulatory Referral to Psychiatry     Referral Priority:   Routine     Referral Type:   Behavorial Health/Psych     Referral Reason:   Specialty Services Required     Requested Specialty:   Psychiatry      "Number of Visits Requested:   1       Current Outpatient Medications   Medication Sig Dispense Refill   • amLODIPine (NORVASC) 5 MG tablet Take 1 tablet by mouth Daily. 90 tablet 3   • calcitriol (ROCALTROL) 0.25 MCG capsule Take 1 capsule by mouth Every Other Day.     • carvedilol (COREG) 6.25 MG tablet Take 6.25 mg by mouth 2 (Two) Times a Day.     • cholecalciferol (VITAMIN D3) 25 MCG (1000 UT) tablet Take 1,000 Units by mouth 2 (two) times a day.     • cyanocobalamin (VITAMIN B-12) 1000 MCG tablet Take 1,000 mcg by mouth.     • haloperidol (HALDOL) 5 MG tablet Take 5 mg by mouth 2 (Two) Times a Day.  0   • hydrALAZINE (APRESOLINE) 100 MG tablet Take 1 tablet by mouth 2 (Two) Times a Day. 180 tablet 3   • insulin aspart (NovoLOG FlexPen) 100 UNIT/ML solution pen-injector sc pen Inject 4 Units under the skin into the appropriate area as directed 3 (Three) Times a Day With Meals. 5 pen 5   • insulin degludec (Tresiba FlexTouch) 100 UNIT/ML solution pen-injector injection 18 units SQ a day 5 pen 5   • Insulin Pen Needle (Pen Needles 3/16\") 31G X 5 MM misc Inject insulin 4 times a day as directed 200 each 5   • lisinopril (PRINIVIL,ZESTRIL) 40 MG tablet TAKE 1 TABLET BY MOUTH EVERY DAY 90 tablet 2   • nortriptyline (PAMELOR) 50 MG capsule Take 2 capsules by mouth Every Night.     • OneTouch Ultra test strip USE TO CHECK 3 TIMES A DAY BEFORE MEALS 300 each 3   • pravastatin (PRAVACHOL) 20 MG tablet Take 1 tablet by mouth Daily. 90 tablet 3   • tamsulosin (FLOMAX) 0.4 MG capsule 24 hr capsule TAKE 1 CAPSULE BY MOUTH EVERY DAY 90 capsule 3   • topiramate (TOPAMAX) 25 MG tablet Take 2 tablets by mouth Every Night.       No current facility-administered medications for this visit.       Krish Castro had no medications administered during this visit.    Return in about 3 months (around 4/20/2022).    There are no Patient Instructions on file for this visit.       "

## 2022-01-21 LAB
ALBUMIN SERPL-MCNC: 4.3 G/DL (ref 3.7–4.7)
ALBUMIN/GLOB SERPL: 2 {RATIO} (ref 1.2–2.2)
ALP SERPL-CCNC: 111 IU/L (ref 44–121)
ALT SERPL-CCNC: 24 IU/L (ref 0–44)
AST SERPL-CCNC: 14 IU/L (ref 0–40)
BILIRUB SERPL-MCNC: 0.5 MG/DL (ref 0–1.2)
BUN SERPL-MCNC: 20 MG/DL (ref 8–27)
BUN/CREAT SERPL: 12 (ref 10–24)
CALCIUM SERPL-MCNC: 9.1 MG/DL (ref 8.6–10.2)
CHLORIDE SERPL-SCNC: 105 MMOL/L (ref 96–106)
CO2 SERPL-SCNC: 24 MMOL/L (ref 20–29)
CREAT SERPL-MCNC: 1.71 MG/DL (ref 0.76–1.27)
GLOBULIN SER CALC-MCNC: 2.2 G/DL (ref 1.5–4.5)
GLUCOSE SERPL-MCNC: 81 MG/DL (ref 65–99)
HBA1C MFR BLD: 5.9 % (ref 4.8–5.6)
HCV AB S/CO SERPL IA: <0.1 S/CO RATIO (ref 0–0.9)
POTASSIUM SERPL-SCNC: 4.6 MMOL/L (ref 3.5–5.2)
PROT SERPL-MCNC: 6.5 G/DL (ref 6–8.5)
SODIUM SERPL-SCNC: 143 MMOL/L (ref 134–144)
VIT B12 SERPL-MCNC: 1032 PG/ML (ref 232–1245)

## 2022-03-05 DIAGNOSIS — I10 HYPERTENSION, ESSENTIAL: ICD-10-CM

## 2022-03-06 RX ORDER — AMLODIPINE BESYLATE 5 MG/1
TABLET ORAL
Qty: 90 TABLET | Refills: 3 | Status: SHIPPED | OUTPATIENT
Start: 2022-03-06 | End: 2023-02-26 | Stop reason: SDUPTHER

## 2022-03-16 ENCOUNTER — TELEPHONE (OUTPATIENT)
Dept: FAMILY MEDICINE CLINIC | Facility: CLINIC | Age: 72
End: 2022-03-16

## 2022-04-07 RX ORDER — PRAVASTATIN SODIUM 20 MG
TABLET ORAL
Qty: 90 TABLET | Refills: 3 | Status: SHIPPED | OUTPATIENT
Start: 2022-04-07

## 2022-04-07 NOTE — TELEPHONE ENCOUNTER
Oxygen delivered at bedside, patient and wife received education on DME from company    Rx Refill Note  Requested Prescriptions     Pending Prescriptions Disp Refills   • pravastatin (PRAVACHOL) 20 MG tablet [Pharmacy Med Name: PRAVASTATIN SODIUM 20 MG TAB] 90 tablet 3     Sig: TAKE 1 TABLET BY MOUTH EVERY DAY      Last office visit with prescribing clinician: 1/20/2022      Next office visit with prescribing clinician: 4/14/2022           Last filled 4/6/2021           Vicky Cohn MA  04/07/22, 07:36 EDT

## 2022-04-11 ENCOUNTER — TELEPHONE (OUTPATIENT)
Dept: FAMILY MEDICINE CLINIC | Facility: CLINIC | Age: 72
End: 2022-04-11

## 2022-04-11 DIAGNOSIS — J30.1 SEASONAL ALLERGIC RHINITIS DUE TO POLLEN: Primary | ICD-10-CM

## 2022-04-11 NOTE — TELEPHONE ENCOUNTER
-Patient stopped by the office asking for ENT Referral for allergy shots specifically:    [facility]  [facility]   [address] [place] [address]  Phone [phone]  Fax [phone]    Fax: [facility]     Patient has appointment scheduled for 04/13/2022 for allergy shots

## 2022-04-14 ENCOUNTER — OFFICE VISIT (OUTPATIENT)
Dept: FAMILY MEDICINE CLINIC | Facility: CLINIC | Age: 72
End: 2022-04-14

## 2022-04-14 VITALS
TEMPERATURE: 98 F | WEIGHT: 206 LBS | HEART RATE: 72 BPM | OXYGEN SATURATION: 99 % | DIASTOLIC BLOOD PRESSURE: 58 MMHG | HEIGHT: 72 IN | BODY MASS INDEX: 27.9 KG/M2 | SYSTOLIC BLOOD PRESSURE: 112 MMHG

## 2022-04-14 DIAGNOSIS — I10 HYPERTENSION, ESSENTIAL: ICD-10-CM

## 2022-04-14 DIAGNOSIS — Z00.00 MEDICARE ANNUAL WELLNESS VISIT, SUBSEQUENT: Primary | ICD-10-CM

## 2022-04-14 DIAGNOSIS — Z79.4 TYPE 2 DIABETES MELLITUS WITH DIABETIC POLYNEUROPATHY, WITH LONG-TERM CURRENT USE OF INSULIN: ICD-10-CM

## 2022-04-14 DIAGNOSIS — E11.42 TYPE 2 DIABETES MELLITUS WITH DIABETIC POLYNEUROPATHY, WITH LONG-TERM CURRENT USE OF INSULIN: ICD-10-CM

## 2022-04-14 DIAGNOSIS — E11.22 CKD STAGE 3 SECONDARY TO DIABETES: ICD-10-CM

## 2022-04-14 DIAGNOSIS — E78.2 MIXED HYPERLIPIDEMIA: ICD-10-CM

## 2022-04-14 DIAGNOSIS — N18.30 CKD STAGE 3 SECONDARY TO DIABETES: ICD-10-CM

## 2022-04-14 PROCEDURE — 1170F FXNL STATUS ASSESSED: CPT | Performed by: FAMILY MEDICINE

## 2022-04-14 PROCEDURE — 1159F MED LIST DOCD IN RCRD: CPT | Performed by: FAMILY MEDICINE

## 2022-04-14 PROCEDURE — G0439 PPPS, SUBSEQ VISIT: HCPCS | Performed by: FAMILY MEDICINE

## 2022-04-14 PROCEDURE — 99214 OFFICE O/P EST MOD 30 MIN: CPT | Performed by: FAMILY MEDICINE

## 2022-04-14 RX ORDER — BLOOD SUGAR DIAGNOSTIC
STRIP MISCELLANEOUS
Qty: 300 EACH | Refills: 3 | Status: SHIPPED | OUTPATIENT
Start: 2022-04-14

## 2022-04-14 NOTE — PROGRESS NOTES
The ABCs of the Annual Wellness Visit  Subsequent Medicare Wellness Visit    Chief Complaint   Patient presents with   • Medicare Wellness-subsequent   F/U HTN.    Subjective    History of Present Illness:  Krish Castro is a 72 y.o. male who presents for a Subsequent Medicare Wellness Visit.  F/U HTN.  No orthostasis.  Doing well with meds.    FU DM2.  BS running 67-180s.    F/U Hyperlipidmeia.  No myalgias.    The following portions of the patient's history were reviewed and   updated as appropriate: allergies, current medications, past family history, past medical history, past social history, past surgical history and problem list.    Compared to one year ago, the patient feels his physical   health is the same.    Compared to one year ago, the patient feels his mental   health is the same.    Recent Hospitalizations:  He was not admitted to the hospital during the last year.       Current Medical Providers:  Patient Care Team:  Dimitris David MD as PCP - General (Family Medicine)    Outpatient Medications Prior to Visit   Medication Sig Dispense Refill   • amLODIPine (NORVASC) 5 MG tablet TAKE 1 TABLET BY MOUTH EVERY DAY 90 tablet 3   • calcitriol (ROCALTROL) 0.25 MCG capsule Take 1 capsule by mouth Every Other Day.     • carvedilol (COREG) 6.25 MG tablet Take 6.25 mg by mouth 2 (Two) Times a Day.     • cholecalciferol (VITAMIN D3) 25 MCG (1000 UT) tablet Take 1,000 Units by mouth 2 (two) times a day.     • cyanocobalamin (VITAMIN B-12) 1000 MCG tablet Take 1,000 mcg by mouth.     • haloperidol (HALDOL) 5 MG tablet Take 5 mg by mouth 2 (Two) Times a Day.  0   • hydrALAZINE (APRESOLINE) 100 MG tablet Take 1 tablet by mouth 2 (Two) Times a Day. 180 tablet 3   • insulin aspart (NovoLOG FlexPen) 100 UNIT/ML solution pen-injector sc pen Inject 4 Units under the skin into the appropriate area as directed 3 (Three) Times a Day With Meals. 5 pen 5   • insulin degludec (Tresiba FlexTouch) 100 UNIT/ML solution  "pen-injector injection 18 units SQ a day 5 pen 5   • Insulin Pen Needle (Pen Needles 3/16\") 31G X 5 MM misc Inject insulin 4 times a day as directed 200 each 5   • lisinopril (PRINIVIL,ZESTRIL) 40 MG tablet TAKE 1 TABLET BY MOUTH EVERY DAY 90 tablet 2   • nortriptyline (PAMELOR) 50 MG capsule Take 2 capsules by mouth Every Night.     • pravastatin (PRAVACHOL) 20 MG tablet TAKE 1 TABLET BY MOUTH EVERY DAY 90 tablet 3   • tamsulosin (FLOMAX) 0.4 MG capsule 24 hr capsule TAKE 1 CAPSULE BY MOUTH EVERY DAY 90 capsule 3   • topiramate (TOPAMAX) 25 MG tablet Take 2 tablets by mouth Every Night.     • OneTouch Ultra test strip USE TO CHECK 3 TIMES A DAY BEFORE MEALS 300 each 3     No facility-administered medications prior to visit.       No opioid medication identified on active medication list. I have reviewed chart for other potential  high risk medication/s and harmful drug interactions in the elderly.          Aspirin is not on active medication list.  Aspirin use is not indicated based on review of current medical condition/s. Risk of harm outweighs potential benefits.  .    Patient Active Problem List   Diagnosis   • Seasonal allergic rhinitis due to pollen   • BPH (benign prostatic hyperplasia)   • Constipation   • Hypertension, essential   • Depression   • Diabetic peripheral neuropathy (HCC)   • Hyperlipidemia   • Type 2 diabetes mellitus with diabetic polyneuropathy, with long-term current use of insulin (HCC)   • Medicare annual wellness visit, subsequent   • CKD stage 3 secondary to diabetes (HCC)   • Thyroid disorder   • Vitamin D deficiency   • B12 deficiency   • Encounter for hepatitis C screening test for low risk patient     Advance Care Planning  Advance Directive is not on file.  ACP discussion was held with the patient during this visit. Patient has an advance directive (not in EMR), copy requested.    Review of Systems   Constitutional: Negative for diaphoresis, fatigue and fever.   HENT: Negative for " "postnasal drip.    Respiratory: Negative for cough and shortness of breath.    Cardiovascular: Negative for chest pain and leg swelling.        Objective    Vitals:    04/14/22 1402   BP: 112/58   BP Location: Left arm   Patient Position: Sitting   Pulse: 72   Temp: 98 °F (36.7 °C)   TempSrc: Temporal   SpO2: 99%   Weight: 93.4 kg (206 lb)   Height: 182.9 cm (72\")     BMI Readings from Last 1 Encounters:   04/14/22 27.94 kg/m²   BMI is above normal parameters. Recommendations include: exercise counseling    Does the patient have evidence of cognitive impairment? No    Physical Exam  Vitals reviewed.   Constitutional:       Appearance: He is well-developed. He is not diaphoretic.   HENT:      Head: Normocephalic and atraumatic.   Eyes:      General: No scleral icterus.     Pupils: Pupils are equal, round, and reactive to light.   Neck:      Thyroid: No thyromegaly.   Cardiovascular:      Rate and Rhythm: Normal rate and regular rhythm.      Heart sounds: No murmur heard.    No friction rub. No gallop.   Pulmonary:      Effort: Pulmonary effort is normal. No respiratory distress.      Breath sounds: No wheezing or rales.   Chest:      Chest wall: No tenderness.   Abdominal:      General: Bowel sounds are normal. There is no distension.      Palpations: Abdomen is soft.      Tenderness: There is no abdominal tenderness.   Musculoskeletal:         General: No deformity. Normal range of motion.   Lymphadenopathy:      Cervical: No cervical adenopathy.   Skin:     General: Skin is warm and dry.      Findings: No rash.   Neurological:      Cranial Nerves: No cranial nerve deficit.      Motor: No abnormal muscle tone.       Lab Results   Component Value Date    HGBA1C 5.9 (H) 01/20/2022            HEALTH RISK ASSESSMENT    Smoking Status:  Social History     Tobacco Use   Smoking Status Former Smoker   • Packs/day: 1.00   • Years: 5.00   • Pack years: 5.00   • Types: Cigarettes   Smokeless Tobacco Never Used   Tobacco " Comment    smoked less than 1 pack per day for less than 5 years     Alcohol Consumption:  Social History     Substance and Sexual Activity   Alcohol Use Yes    Comment: drinks alcohol, 7 or less drinks per week     Fall Risk Screen:    TAMIKO Fall Risk Assessment has not been completed.    Depression Screening:  PHQ-2/PHQ-9 Depression Screening 4/14/2022   Retired Total Score -   Little Interest or Pleasure in Doing Things 0-->not at all   Feeling Down, Depressed or Hopeless 0-->not at all   Trouble Falling or Staying Asleep, or Sleeping Too Much 0-->not at all   Feeling Tired or Having Little Energy 0-->not at all   Poor Appetite or Overeating 0-->not at all   Feeling Bad about Yourself - or that You are a Failure or Have Let Yourself or Your Family Down 0-->not at all   Trouble Concentrating on Things, Such as Reading the Newspaper or Watching Television 0-->not at all   Moving or Speaking So Slowly that Other People Could Have Noticed? Or the Opposite - Being So Fidgety 0-->not at all   Thoughts that You Would be Better Off Dead or of Hurting Yourself in Some Way 0-->not at all   PHQ-9: Brief Depression Severity Measure Score 0   If You Checked Off Any Problems, How Difficult Have These Problems Made It For You to Do Your Work, Take Care of Things at Home, or Get Along with Other People? not difficult at all       Health Habits and Functional and Cognitive Screening:  Functional & Cognitive Status 4/14/2022   Do you have difficulty preparing food and eating? No   Do you have difficulty bathing yourself, getting dressed or grooming yourself? No   Do you have difficulty using the toilet? No   Do you have difficulty moving around from place to place? No   Do you have trouble with steps or getting out of a bed or a chair? No   Current Diet Well Balanced Diet   Dental Exam Up to date   Eye Exam Up to date   Exercise (times per week) 3 times per week   Current Exercises Include Home Fitness Gym   Current Exercise  Activities Include -   Do you need help using the phone?  No   Are you deaf or do you have serious difficulty hearing?  No   Do you need help with transportation? No   Do you need help shopping? No   Do you need help preparing meals?  No   Do you need help with housework?  No   Do you need help with laundry? No   Do you need help taking your medications? No   Do you need help managing money? No   Do you ever drive or ride in a car without wearing a seat belt? No   Have you felt unusual stress, anger or loneliness in the last month? No   Who do you live with? Alone   If you need help, do you have trouble finding someone available to you? No   Have you been bothered in the last four weeks by sexual problems? -   Do you have difficulty concentrating, remembering or making decisions? No       Age-appropriate Screening Schedule:  Refer to the list below for future screening recommendations based on patient's age, sex and/or medical conditions. Orders for these recommended tests are listed in the plan section. The patient has been provided with a written plan.    Health Maintenance   Topic Date Due   • URINE MICROALBUMIN  Never done   • TDAP/TD VACCINES (2 - Tdap) 03/06/2007   • DIABETIC FOOT EXAM  Never done   • HEMOGLOBIN A1C  07/20/2022   • INFLUENZA VACCINE  08/01/2022   • LIPID PANEL  10/05/2022   • DIABETIC EYE EXAM  11/19/2022   • ZOSTER VACCINE  Completed              Assessment/Plan   CMS Preventative Services Quick Reference  Risk Factors Identified During Encounter  Inactivity/Sedentary  The above risks/problems have been discussed with the patient.  Follow up actions/plans if indicated are seen below in the Assessment/Plan Section.  Pertinent information has been shared with the patient in the After Visit Summary.    Diagnoses and all orders for this visit:    1. Medicare annual wellness visit, subsequent (Primary)    2. Type 2 diabetes mellitus with diabetic polyneuropathy, with long-term current use of  insulin (HCC)  -     glucose blood (OneTouch Ultra) test strip; Use as instructed to check TID before meals.  Dispense: 300 each; Refill: 3  -     Hemoglobin A1c  -     Comprehensive Metabolic Panel    3. CKD stage 3 secondary to diabetes (HCC)  -     Ambulatory Referral to Nephrology  -     Comprehensive Metabolic Panel    4. Mixed hyperlipidemia  -     Comprehensive Metabolic Panel    5. Hypertension, essential  -     Comprehensive Metabolic Panel    HTN with Stage 3 CKD.  Check CMP.  Controlled.  Continue meds. Continue nephrology.    DM2.  Controlled.  Continue meds.  Check A1c.  RF test strips today.    Hyperlipidmeia.  Continue statin.  RF of pravastatin given to pt.  LDL 62 from 10/21.      Preventive Counseling:  Encouraged to stay active.  Covid vaccine booster number 2 recommended. Pt getting at [facility].    HEP A UTD.  Pneumovax UTD.  Colonoscopy UTD.    Dentist UTD.  Optho UTD.      Follow Up:   Return in about 4 months (around 8/14/2022).     An After Visit Summary and PPPS were made available to the patient.

## 2022-04-15 LAB
ALBUMIN SERPL-MCNC: 4 G/DL (ref 3.7–4.7)
ALBUMIN/GLOB SERPL: 2 {RATIO} (ref 1.2–2.2)
ALP SERPL-CCNC: 127 IU/L (ref 44–121)
ALT SERPL-CCNC: 29 IU/L (ref 0–44)
AST SERPL-CCNC: 11 IU/L (ref 0–40)
BILIRUB SERPL-MCNC: 0.3 MG/DL (ref 0–1.2)
BUN SERPL-MCNC: 17 MG/DL (ref 8–27)
BUN/CREAT SERPL: 10 (ref 10–24)
CALCIUM SERPL-MCNC: 9.6 MG/DL (ref 8.6–10.2)
CHLORIDE SERPL-SCNC: 105 MMOL/L (ref 96–106)
CO2 SERPL-SCNC: 23 MMOL/L (ref 20–29)
CREAT SERPL-MCNC: 1.73 MG/DL (ref 0.76–1.27)
EGFRCR SERPLBLD CKD-EPI 2021: 41 ML/MIN/1.73
GLOBULIN SER CALC-MCNC: 2 G/DL (ref 1.5–4.5)
GLUCOSE SERPL-MCNC: 125 MG/DL (ref 65–99)
HBA1C MFR BLD: 6.1 % (ref 4.8–5.6)
POTASSIUM SERPL-SCNC: 4.9 MMOL/L (ref 3.5–5.2)
PROT SERPL-MCNC: 6 G/DL (ref 6–8.5)
SODIUM SERPL-SCNC: 141 MMOL/L (ref 134–144)

## 2022-05-04 ENCOUNTER — TELEPHONE (OUTPATIENT)
Dept: FAMILY MEDICINE CLINIC | Facility: CLINIC | Age: 72
End: 2022-05-04

## 2022-05-04 RX ORDER — HYDRALAZINE HYDROCHLORIDE 100 MG/1
100 TABLET, FILM COATED ORAL 2 TIMES DAILY
Qty: 180 TABLET | Refills: 3 | Status: SHIPPED | OUTPATIENT
Start: 2022-05-04

## 2022-05-04 NOTE — TELEPHONE ENCOUNTER
Last office visit with prescribing clinician: 4/14/2022      Next office visit with prescribing clinician: 8/11/2022           Last filled 529/2020           Vicky Cohn MA  05/04/22, 15:07 EDT

## 2022-05-04 NOTE — TELEPHONE ENCOUNTER
Patient needs refill on medication below. Confirmed Pharmacy [facility] [phone]    hydrALAZINE (APRESOLINE) 100 MG tablet        Sig: Take 1 tablet by mouth 2 (Two) Times a Day.        Sent to pharmacy as: hydrALAZINE HCl 100 MG Oral Tablet (APRESOLINE)        Class: Normal        Notes to Pharmacy: Please send request electronically for faster turn around, thanks.        Route: Oral

## 2022-06-12 DIAGNOSIS — I10 HYPERTENSION, ESSENTIAL: ICD-10-CM

## 2022-06-13 RX ORDER — LISINOPRIL 40 MG/1
TABLET ORAL
Qty: 90 TABLET | Refills: 2 | Status: SHIPPED | OUTPATIENT
Start: 2022-06-13 | End: 2022-12-12

## 2022-08-11 ENCOUNTER — OFFICE VISIT (OUTPATIENT)
Dept: FAMILY MEDICINE CLINIC | Facility: CLINIC | Age: 72
End: 2022-08-11

## 2022-08-11 VITALS
BODY MASS INDEX: 27.97 KG/M2 | WEIGHT: 206.2 LBS | OXYGEN SATURATION: 98 % | HEART RATE: 59 BPM | SYSTOLIC BLOOD PRESSURE: 126 MMHG | TEMPERATURE: 97.8 F | DIASTOLIC BLOOD PRESSURE: 60 MMHG

## 2022-08-11 DIAGNOSIS — Z79.4 TYPE 2 DIABETES MELLITUS WITH DIABETIC POLYNEUROPATHY, WITH LONG-TERM CURRENT USE OF INSULIN: ICD-10-CM

## 2022-08-11 DIAGNOSIS — E11.42 TYPE 2 DIABETES MELLITUS WITH DIABETIC POLYNEUROPATHY, WITH LONG-TERM CURRENT USE OF INSULIN: ICD-10-CM

## 2022-08-11 DIAGNOSIS — N40.0 BENIGN PROSTATIC HYPERPLASIA, UNSPECIFIED WHETHER LOWER URINARY TRACT SYMPTOMS PRESENT: ICD-10-CM

## 2022-08-11 DIAGNOSIS — I10 HYPERTENSION, ESSENTIAL: Primary | ICD-10-CM

## 2022-08-11 PROCEDURE — 99214 OFFICE O/P EST MOD 30 MIN: CPT | Performed by: FAMILY MEDICINE

## 2022-08-11 RX ORDER — INSULIN ASPART 100 [IU]/ML
4 INJECTION, SOLUTION INTRAVENOUS; SUBCUTANEOUS
Qty: 5 PEN | Refills: 5 | Status: SHIPPED | OUTPATIENT
Start: 2022-08-11 | End: 2022-12-15 | Stop reason: SDUPTHER

## 2022-08-11 NOTE — PROGRESS NOTES
Chief Complaint   Patient presents with   • Diabetes       Subjective   Krish Castro is a 72 y.o. male.     History of Present Illness   F/U DM2. BS running 100-165 usually.      F/U HTn.  Doing well with meds.    The following portions of the patient's history were reviewed and updated as appropriate: allergies, current medications, past family history, past medical history, past social history, past surgical history and problem list.    Review of Systems   Constitutional: Negative for appetite change and fatigue.   HENT: Negative for nosebleeds and sore throat.    Eyes: Negative for blurred vision and visual disturbance.   Respiratory: Negative for shortness of breath and wheezing.    Cardiovascular: Negative for chest pain and leg swelling.   Gastrointestinal: Negative for abdominal distention and abdominal pain.   Endocrine: Negative for cold intolerance and polyuria.   Genitourinary: Negative for dysuria and hematuria.   Musculoskeletal: Negative for arthralgias and myalgias.   Skin: Negative for color change and rash.   Neurological: Negative for weakness and confusion.   Psychiatric/Behavioral: Negative for agitation and depressed mood.       Patient Active Problem List   Diagnosis   • Seasonal allergic rhinitis due to pollen   • BPH (benign prostatic hyperplasia)   • Constipation   • Hypertension, essential   • Depression   • Diabetic peripheral neuropathy (HCC)   • Hyperlipidemia   • Type 2 diabetes mellitus with diabetic polyneuropathy, with long-term current use of insulin (HCC)   • Medicare annual wellness visit, subsequent   • CKD stage 3 secondary to diabetes (HCC)   • Thyroid disorder   • Vitamin D deficiency   • B12 deficiency   • Encounter for hepatitis C screening test for low risk patient       Allergies   Allergen Reactions   • Hydrocodone Itching and Other (See Comments)     Headaches/trembles         Current Outpatient Medications:   •  insulin aspart (NovoLOG FlexPen) 100 UNIT/ML solution  "pen-injector sc pen, Inject 4 Units under the skin into the appropriate area as directed 3 (Three) Times a Day With Meals., Disp: 5 pen, Rfl: 5  •  amLODIPine (NORVASC) 5 MG tablet, TAKE 1 TABLET BY MOUTH EVERY DAY, Disp: 90 tablet, Rfl: 3  •  calcitriol (ROCALTROL) 0.25 MCG capsule, Take 1 capsule by mouth Every Other Day., Disp: , Rfl:   •  carvedilol (COREG) 6.25 MG tablet, Take 6.25 mg by mouth 2 (Two) Times a Day., Disp: , Rfl:   •  cholecalciferol (VITAMIN D3) 25 MCG (1000 UT) tablet, Take 1,000 Units by mouth 2 (two) times a day., Disp: , Rfl:   •  cyanocobalamin (VITAMIN B-12) 1000 MCG tablet, Take 1,000 mcg by mouth., Disp: , Rfl:   •  glucose blood (OneTouch Ultra) test strip, Use as instructed to check TID before meals., Disp: 300 each, Rfl: 3  •  haloperidol (HALDOL) 5 MG tablet, Take 5 mg by mouth 2 (Two) Times a Day., Disp: , Rfl: 0  •  hydrALAZINE (APRESOLINE) 100 MG tablet, Take 1 tablet by mouth 2 (Two) Times a Day., Disp: 180 tablet, Rfl: 3  •  insulin degludec (Tresiba FlexTouch) 100 UNIT/ML solution pen-injector injection, 18 units SQ a day, Disp: 5 pen, Rfl: 5  •  Insulin Pen Needle (Pen Needles 3/16\") 31G X 5 MM misc, Inject insulin 4 times a day as directed, Disp: 200 each, Rfl: 5  •  lisinopril (PRINIVIL,ZESTRIL) 40 MG tablet, TAKE 1 TABLET BY MOUTH EVERY DAY, Disp: 90 tablet, Rfl: 2  •  nortriptyline (PAMELOR) 50 MG capsule, Take 2 capsules by mouth Every Night., Disp: , Rfl:   •  pravastatin (PRAVACHOL) 20 MG tablet, TAKE 1 TABLET BY MOUTH EVERY DAY, Disp: 90 tablet, Rfl: 3  •  tamsulosin (FLOMAX) 0.4 MG capsule 24 hr capsule, TAKE 1 CAPSULE BY MOUTH EVERY DAY, Disp: 90 capsule, Rfl: 3  •  topiramate (TOPAMAX) 25 MG tablet, Take 2 tablets by mouth Every Night., Disp: , Rfl:     Past Medical History:   Diagnosis Date   • Allergic rhinitis    • BPH (benign prostatic hyperplasia)    • Chronic kidney disease (CKD) stage G2/A1, mildly decreased glomerular filtration rate (GFR) between 60-89 " mL/min/1.73 square meter and albuminuria creatinine ratio less than 30 mg/g    • Constipation    • Constipation, chronic    • Depression    • Diabetes mellitus (HCC)    • Diabetic peripheral neuropathy (HCC)    • Former smoker    • High blood pressure    • High cholesterol    • Hyperlipidemia    • Hypertension, essential    • Inflamed skin tag    • Medicare annual wellness visit, subsequent    • Prostate disease    • Rash    • Seasonal allergic rhinitis due to pollen    • Stage 2 chronic kidney disease    • Thyroid disorder    • Tinea cruris    • Type 1 diabetes mellitus (HCC)    • Vitamin D deficiency        Past Surgical History:   Procedure Laterality Date   • COLONOSCOPY  12/24/20, 11/2015    3 polyps,  2 polyps   • OTHER SURGICAL HISTORY      Sialolithotomy-Abstracted from [employer]       Family History   Problem Relation Age of Onset   • Hyperlipidemia Mother    • Colon cancer Mother    • Hypertension Mother    • Anxiety disorder Mother    • Heart disease Mother    • Diabetes type I Father    • Colon cancer Brother    • Diabetes type I Brother    • Heart disease Other         In females before age 65, and males before age 55       Social History     Tobacco Use   • Smoking status: Former Smoker     Packs/day: 1.00     Years: 5.00     Pack years: 5.00     Types: Cigarettes   • Smokeless tobacco: Never Used   • Tobacco comment: smoked less than 1 pack per day for less than 5 years   Substance Use Topics   • Alcohol use: Yes     Comment: drinks alcohol, 7 or less drinks per week            Objective     Vitals:    08/11/22 1342   BP: 126/60   Pulse: 59   Temp: 97.8 °F (36.6 °C)   SpO2: 98%     Body mass index is 27.97 kg/m².    Physical Exam  Vitals reviewed.   Constitutional:       Appearance: He is well-developed. He is not diaphoretic.   HENT:      Head: Normocephalic and atraumatic.   Eyes:      General: No scleral icterus.     Pupils: Pupils are equal, round, and reactive to light.   Neck:      Thyroid: No  thyromegaly.   Cardiovascular:      Rate and Rhythm: Normal rate and regular rhythm.      Heart sounds: No murmur heard.    No friction rub. No gallop.   Pulmonary:      Effort: Pulmonary effort is normal. No respiratory distress.      Breath sounds: No wheezing or rales.   Chest:      Chest wall: No tenderness.   Abdominal:      General: Bowel sounds are normal. There is no distension.      Palpations: Abdomen is soft.      Tenderness: There is no abdominal tenderness.   Musculoskeletal:         General: No deformity. Normal range of motion.   Lymphadenopathy:      Cervical: No cervical adenopathy.   Skin:     General: Skin is warm and dry.      Findings: No rash.   Neurological:      Cranial Nerves: No cranial nerve deficit.      Motor: No abnormal muscle tone.         Lab Results   Component Value Date    GLUCOSE 125 (H) 04/14/2022    BUN 17 04/14/2022    CREATININE 1.73 (H) 04/14/2022    EGFRIFNONA 39 (L) 01/20/2022    EGFRIFAFRI 45 (L) 01/20/2022    BCR 10 04/14/2022    K 4.9 04/14/2022    CO2 23 04/14/2022    CALCIUM 9.6 04/14/2022    PROTENTOTREF 6.0 04/14/2022    ALBUMIN 4.0 04/14/2022    LABIL2 2.0 04/14/2022    AST 11 04/14/2022    ALT 29 04/14/2022       No results found for: WBC, RBC, HGB, HCT, MCV, MCH, MCHC, RDW, RDWSD, MPV, PLT, NEUTRORELPCT, LYMPHORELPCT, MONORELPCT, EOSRELPCT, BASORELPCT, AUTOIGPER, NEUTROABS, LYMPHSABS, MONOSABS, EOSABS, BASOSABS, AUTOIGNUM, NRBC    Lab Results   Component Value Date    HGBA1C 6.1 (H) 04/14/2022       Lab Results   Component Value Date    EXMQIPYJ20 1,032 01/20/2022       TSH   Date Value Ref Range Status   09/24/2019 2.640 0.270 - 4.200 uIU/mL Final       No results found for: CHOL  Lab Results   Component Value Date    TRIG 63 10/05/2021     Lab Results   Component Value Date    HDL 50 10/05/2021     Lab Results   Component Value Date    LDL 62 10/05/2021     Lab Results   Component Value Date    VLDL 13 10/05/2021     No results found for:  [facility]      Procedures    Assessment & Plan   Problems Addressed this Visit     BPH (benign prostatic hyperplasia)    Relevant Orders    PSA DIAGNOSTIC    Hypertension, essential - Primary    Relevant Orders    Comprehensive Metabolic Panel    Type 2 diabetes mellitus with diabetic polyneuropathy, with long-term current use of insulin (HCC)    Relevant Medications    insulin aspart (NovoLOG FlexPen) 100 UNIT/ML solution pen-injector sc pen    Other Relevant Orders    Comprehensive Metabolic Panel    Hemoglobin A1c      Diagnoses       Codes Comments    Hypertension, essential    -  Primary ICD-10-CM: I10  ICD-9-CM: 401.9     Type 2 diabetes mellitus with diabetic polyneuropathy, with long-term current use of insulin (HCC)     ICD-10-CM: E11.42, Z79.4  ICD-9-CM: 250.60, 357.2, V58.67     Benign prostatic hyperplasia, unspecified whether lower urinary tract symptoms present     ICD-10-CM: N40.0  ICD-9-CM: 600.00         HTn.  Controlle.d  Continue meds.  Check CMP.    Dm2.  Controlle.d  Check A1c.  LDL at goal 10/21.  RF novolog.    Orders Placed This Encounter   Procedures   • Comprehensive Metabolic Panel     Order Specific Question:   Release to patient     Answer:   Routine Release   • Hemoglobin A1c     Order Specific Question:   Release to patient     Answer:   Routine Release   • PSA DIAGNOSTIC     Order Specific Question:   Release to patient     Answer:   Routine Release       Current Outpatient Medications   Medication Sig Dispense Refill   • insulin aspart (NovoLOG FlexPen) 100 UNIT/ML solution pen-injector sc pen Inject 4 Units under the skin into the appropriate area as directed 3 (Three) Times a Day With Meals. 5 pen 5   • amLODIPine (NORVASC) 5 MG tablet TAKE 1 TABLET BY MOUTH EVERY DAY 90 tablet 3   • calcitriol (ROCALTROL) 0.25 MCG capsule Take 1 capsule by mouth Every Other Day.     • carvedilol (COREG) 6.25 MG tablet Take 6.25 mg by mouth 2 (Two) Times a Day.     • cholecalciferol (VITAMIN D3) 25  "MCG (1000 UT) tablet Take 1,000 Units by mouth 2 (two) times a day.     • cyanocobalamin (VITAMIN B-12) 1000 MCG tablet Take 1,000 mcg by mouth.     • glucose blood (OneTouch Ultra) test strip Use as instructed to check TID before meals. 300 each 3   • haloperidol (HALDOL) 5 MG tablet Take 5 mg by mouth 2 (Two) Times a Day.  0   • hydrALAZINE (APRESOLINE) 100 MG tablet Take 1 tablet by mouth 2 (Two) Times a Day. 180 tablet 3   • insulin degludec (Tresiba FlexTouch) 100 UNIT/ML solution pen-injector injection 18 units SQ a day 5 pen 5   • Insulin Pen Needle (Pen Needles 3/16\") 31G X 5 MM misc Inject insulin 4 times a day as directed 200 each 5   • lisinopril (PRINIVIL,ZESTRIL) 40 MG tablet TAKE 1 TABLET BY MOUTH EVERY DAY 90 tablet 2   • nortriptyline (PAMELOR) 50 MG capsule Take 2 capsules by mouth Every Night.     • pravastatin (PRAVACHOL) 20 MG tablet TAKE 1 TABLET BY MOUTH EVERY DAY 90 tablet 3   • tamsulosin (FLOMAX) 0.4 MG capsule 24 hr capsule TAKE 1 CAPSULE BY MOUTH EVERY DAY 90 capsule 3   • topiramate (TOPAMAX) 25 MG tablet Take 2 tablets by mouth Every Night.       No current facility-administered medications for this visit.       Krish Castro had no medications administered during this visit.    Return in about 4 months (around 12/11/2022).    There are no Patient Instructions on file for this visit.       "

## 2022-08-12 LAB
ALBUMIN SERPL-MCNC: 4 G/DL (ref 3.7–4.7)
ALBUMIN/GLOB SERPL: 1.9 {RATIO} (ref 1.2–2.2)
ALP SERPL-CCNC: 119 IU/L (ref 44–121)
ALT SERPL-CCNC: 23 IU/L (ref 0–44)
AST SERPL-CCNC: 12 IU/L (ref 0–40)
BILIRUB SERPL-MCNC: 0.4 MG/DL (ref 0–1.2)
BUN SERPL-MCNC: 16 MG/DL (ref 8–27)
BUN/CREAT SERPL: 9 (ref 10–24)
CALCIUM SERPL-MCNC: 9.1 MG/DL (ref 8.6–10.2)
CHLORIDE SERPL-SCNC: 104 MMOL/L (ref 96–106)
CO2 SERPL-SCNC: 24 MMOL/L (ref 20–29)
CREAT SERPL-MCNC: 1.79 MG/DL (ref 0.76–1.27)
EGFRCR SERPLBLD CKD-EPI 2021: 40 ML/MIN/1.73
GLOBULIN SER CALC-MCNC: 2.1 G/DL (ref 1.5–4.5)
GLUCOSE SERPL-MCNC: 171 MG/DL (ref 65–99)
HBA1C MFR BLD: 5.9 % (ref 4.8–5.6)
POTASSIUM SERPL-SCNC: 4.7 MMOL/L (ref 3.5–5.2)
PROT SERPL-MCNC: 6.1 G/DL (ref 6–8.5)
PSA SERPL-MCNC: 0.7 NG/ML (ref 0–4)
SODIUM SERPL-SCNC: 141 MMOL/L (ref 134–144)

## 2022-10-19 DIAGNOSIS — N40.0 BENIGN PROSTATIC HYPERPLASIA, UNSPECIFIED WHETHER LOWER URINARY TRACT SYMPTOMS PRESENT: ICD-10-CM

## 2022-10-19 RX ORDER — TAMSULOSIN HYDROCHLORIDE 0.4 MG/1
CAPSULE ORAL
Qty: 90 CAPSULE | Refills: 3 | Status: SHIPPED | OUTPATIENT
Start: 2022-10-19

## 2022-10-19 NOTE — TELEPHONE ENCOUNTER
Rx Refill Note  Requested Prescriptions     Pending Prescriptions Disp Refills   • tamsulosin (FLOMAX) 0.4 MG capsule 24 hr capsule [Pharmacy Med Name: TAMSULOSIN HCL 0.4 MG CAPSULE] 90 capsule 3     Sig: TAKE 1 CAPSULE BY MOUTH EVERY DAY      Last office visit with prescribing clinician: 8/11/2022      Next office visit with prescribing clinician: 12/15/2022

## 2022-11-16 ENCOUNTER — TELEPHONE (OUTPATIENT)
Dept: FAMILY MEDICINE CLINIC | Facility: CLINIC | Age: 72
End: 2022-11-16

## 2022-11-16 DIAGNOSIS — E11.42 TYPE 2 DIABETES MELLITUS WITH DIABETIC POLYNEUROPATHY, WITH LONG-TERM CURRENT USE OF INSULIN: Primary | ICD-10-CM

## 2022-11-16 DIAGNOSIS — Z79.4 TYPE 2 DIABETES MELLITUS WITH DIABETIC POLYNEUROPATHY, WITH LONG-TERM CURRENT USE OF INSULIN: Primary | ICD-10-CM

## 2022-11-16 NOTE — TELEPHONE ENCOUNTER
-pt stopped by the  asking for referral for     Dr. Maximino Lara    [facility]   [address]   Phone [phone]  Fax [phone]    Diagnosis: diabetic eye exam

## 2022-12-11 DIAGNOSIS — I10 HYPERTENSION, ESSENTIAL: ICD-10-CM

## 2022-12-12 RX ORDER — LISINOPRIL 40 MG/1
TABLET ORAL
Qty: 90 TABLET | Refills: 2 | Status: SHIPPED | OUTPATIENT
Start: 2022-12-12

## 2022-12-15 ENCOUNTER — OFFICE VISIT (OUTPATIENT)
Dept: FAMILY MEDICINE CLINIC | Facility: CLINIC | Age: 72
End: 2022-12-15

## 2022-12-15 VITALS
BODY MASS INDEX: 27.9 KG/M2 | SYSTOLIC BLOOD PRESSURE: 130 MMHG | HEIGHT: 72 IN | OXYGEN SATURATION: 97 % | TEMPERATURE: 98.2 F | WEIGHT: 206 LBS | DIASTOLIC BLOOD PRESSURE: 62 MMHG | HEART RATE: 63 BPM

## 2022-12-15 DIAGNOSIS — E11.42 TYPE 2 DIABETES MELLITUS WITH DIABETIC POLYNEUROPATHY, WITH LONG-TERM CURRENT USE OF INSULIN: Primary | ICD-10-CM

## 2022-12-15 DIAGNOSIS — E78.2 MIXED HYPERLIPIDEMIA: ICD-10-CM

## 2022-12-15 DIAGNOSIS — Z79.4 TYPE 2 DIABETES MELLITUS WITH DIABETIC POLYNEUROPATHY, WITH LONG-TERM CURRENT USE OF INSULIN: Primary | ICD-10-CM

## 2022-12-15 DIAGNOSIS — E55.9 VITAMIN D DEFICIENCY: ICD-10-CM

## 2022-12-15 DIAGNOSIS — E53.8 B12 DEFICIENCY: ICD-10-CM

## 2022-12-15 DIAGNOSIS — I10 HYPERTENSION, ESSENTIAL: ICD-10-CM

## 2022-12-15 PROCEDURE — 99214 OFFICE O/P EST MOD 30 MIN: CPT | Performed by: FAMILY MEDICINE

## 2022-12-15 RX ORDER — INSULIN ASPART 100 [IU]/ML
4 INJECTION, SOLUTION INTRAVENOUS; SUBCUTANEOUS
Qty: 15 ML | Refills: 3 | Status: SHIPPED | OUTPATIENT
Start: 2022-12-15

## 2022-12-15 NOTE — PROGRESS NOTES
Chief Complaint   Patient presents with   • Diabetes       Subjective   Krish Castro is a 72 y.o. male.     History of Present Illness   F/U DM2.  Doing well with meds.  On tresiba 18 units a day.  On novolog 4 units with meals.    F/U HTN.  No orhtostasis.  Doing well with meds.   F/u hyperlipidmeia . On meds regulalry.     The following portions of the patient's history were reviewed and updated as appropriate: allergies, current medications, past family history, past medical history, past social history, past surgical history and problem list.    Review of Systems   Constitutional: Negative for appetite change and fatigue.   HENT: Negative for nosebleeds and sore throat.    Eyes: Negative for blurred vision and visual disturbance.   Respiratory: Negative for shortness of breath and wheezing.    Cardiovascular: Negative for chest pain and leg swelling.   Gastrointestinal: Negative for abdominal distention and abdominal pain.   Endocrine: Negative for cold intolerance and polyuria.   Genitourinary: Negative for dysuria and hematuria.   Musculoskeletal: Negative for arthralgias and myalgias.   Skin: Negative for color change and rash.   Neurological: Negative for weakness and confusion.   Psychiatric/Behavioral: Negative for agitation and depressed mood.       Patient Active Problem List   Diagnosis   • Seasonal allergic rhinitis due to pollen   • BPH (benign prostatic hyperplasia)   • Constipation   • Hypertension, essential   • Depression   • Diabetic peripheral neuropathy (HCC)   • Hyperlipidemia   • Type 2 diabetes mellitus with diabetic polyneuropathy, with long-term current use of insulin (HCC)   • Medicare annual wellness visit, subsequent   • CKD stage 3 secondary to diabetes (HCC)   • Thyroid disorder   • Vitamin D deficiency   • B12 deficiency   • Encounter for hepatitis C screening test for low risk patient       Allergies   Allergen Reactions   • Hydrocodone Itching and Other (See Comments)      "Headaches/trembles         Current Outpatient Medications:   •  amLODIPine (NORVASC) 5 MG tablet, TAKE 1 TABLET BY MOUTH EVERY DAY, Disp: 90 tablet, Rfl: 3  •  calcitriol (ROCALTROL) 0.25 MCG capsule, Take 1 capsule by mouth Every Other Day., Disp: , Rfl:   •  carvedilol (COREG) 6.25 MG tablet, Take 6.25 mg by mouth 2 (Two) Times a Day., Disp: , Rfl:   •  cholecalciferol (VITAMIN D3) 25 MCG (1000 UT) tablet, Take 1,000 Units by mouth 2 (two) times a day., Disp: , Rfl:   •  cyanocobalamin (VITAMIN B-12) 1000 MCG tablet, Take 1,000 mcg by mouth., Disp: , Rfl:   •  glucose blood (OneTouch Ultra) test strip, Use as instructed to check TID before meals., Disp: 300 each, Rfl: 3  •  haloperidol (HALDOL) 5 MG tablet, Take 5 mg by mouth 2 (Two) Times a Day., Disp: , Rfl: 0  •  hydrALAZINE (APRESOLINE) 100 MG tablet, Take 1 tablet by mouth 2 (Two) Times a Day., Disp: 180 tablet, Rfl: 3  •  insulin aspart (NovoLOG FlexPen) 100 UNIT/ML solution pen-injector sc pen, Inject 4 Units under the skin into the appropriate area as directed 3 (Three) Times a Day With Meals., Disp: 15 mL, Rfl: 3  •  insulin degludec (Tresiba FlexTouch) 100 UNIT/ML solution pen-injector injection, 18 units SQ a day, Disp: 5 pen, Rfl: 5  •  Insulin Pen Needle (Pen Needles 3/16\") 31G X 5 MM misc, Inject insulin 4 times a day as directed, Disp: 200 each, Rfl: 5  •  lisinopril (PRINIVIL,ZESTRIL) 40 MG tablet, TAKE 1 TABLET BY MOUTH EVERY DAY, Disp: 90 tablet, Rfl: 2  •  nortriptyline (PAMELOR) 50 MG capsule, Take 2 capsules by mouth Every Night., Disp: , Rfl:   •  pravastatin (PRAVACHOL) 20 MG tablet, TAKE 1 TABLET BY MOUTH EVERY DAY, Disp: 90 tablet, Rfl: 3  •  tamsulosin (FLOMAX) 0.4 MG capsule 24 hr capsule, TAKE 1 CAPSULE BY MOUTH EVERY DAY, Disp: 90 capsule, Rfl: 3  •  topiramate (TOPAMAX) 25 MG tablet, Take 2 tablets by mouth Every Night., Disp: , Rfl:     Past Medical History:   Diagnosis Date   • Allergic rhinitis    • BPH (benign prostatic " hyperplasia)    • Chronic kidney disease (CKD) stage G2/A1, mildly decreased glomerular filtration rate (GFR) between 60-89 mL/min/1.73 square meter and albuminuria creatinine ratio less than 30 mg/g    • Constipation    • Constipation, chronic    • Depression    • Diabetes mellitus (HCC)    • Diabetic peripheral neuropathy (HCC)    • Former smoker    • High blood pressure    • High cholesterol    • Hyperlipidemia    • Hypertension, essential    • Inflamed skin tag    • Medicare annual wellness visit, subsequent    • Prostate disease    • Rash    • Seasonal allergic rhinitis due to pollen    • Stage 2 chronic kidney disease    • Thyroid disorder    • Tinea cruris    • Type 1 diabetes mellitus (HCC)    • Vitamin D deficiency        Past Surgical History:   Procedure Laterality Date   • COLONOSCOPY  12/24/20, 11/2015    3 polyps,  2 polyps   • OTHER SURGICAL HISTORY      Sialolithotomy-Abstracted from [employer]       Family History   Problem Relation Age of Onset   • Hyperlipidemia Mother    • Colon cancer Mother    • Hypertension Mother    • Anxiety disorder Mother    • Heart disease Mother    • Diabetes type I Father    • Colon cancer Brother    • Diabetes type I Brother    • Heart disease Other         In females before age 65, and males before age 55       Social History     Tobacco Use   • Smoking status: Former     Packs/day: 1.00     Years: 5.00     Pack years: 5.00     Types: Cigarettes   • Smokeless tobacco: Never   • Tobacco comments:     smoked less than 1 pack per day for less than 5 years   Substance Use Topics   • Alcohol use: Yes     Comment: drinks alcohol, 7 or less drinks per week            Objective     Vitals:    12/15/22 1417   BP: 130/62   Pulse:    Temp:    SpO2:      Body mass index is 27.93 kg/m².    Physical Exam  Vitals reviewed.   Constitutional:       Appearance: He is well-developed. He is not diaphoretic.   HENT:      Head: Normocephalic and atraumatic.   Eyes:      General: No scleral  icterus.     Pupils: Pupils are equal, round, and reactive to light.   Neck:      Thyroid: No thyromegaly.   Cardiovascular:      Rate and Rhythm: Normal rate and regular rhythm.      Heart sounds: No murmur heard.    No friction rub. No gallop.   Pulmonary:      Effort: Pulmonary effort is normal. No respiratory distress.      Breath sounds: No wheezing or rales.   Chest:      Chest wall: No tenderness.   Abdominal:      General: Bowel sounds are normal. There is no distension.      Palpations: Abdomen is soft.      Tenderness: There is no abdominal tenderness.   Musculoskeletal:         General: No deformity. Normal range of motion.   Lymphadenopathy:      Cervical: No cervical adenopathy.   Skin:     General: Skin is warm and dry.      Findings: No rash.   Neurological:      Cranial Nerves: No cranial nerve deficit.      Motor: No abnormal muscle tone.         Lab Results   Component Value Date    GLUCOSE 171 (H) 08/11/2022    BUN 16 08/11/2022    CREATININE 1.79 (H) 08/11/2022    EGFRIFNONA 39 (L) 01/20/2022    EGFRIFAFRI 45 (L) 01/20/2022    BCR 9 (L) 08/11/2022    K 4.7 08/11/2022    CO2 24 08/11/2022    CALCIUM 9.1 08/11/2022    PROTENTOTREF 6.1 08/11/2022    ALBUMIN 4.0 08/11/2022    LABIL2 1.9 08/11/2022    AST 12 08/11/2022    ALT 23 08/11/2022       No results found for: WBC, RBC, HGB, HCT, MCV, MCH, MCHC, RDW, RDWSD, MPV, PLT, NEUTRORELPCT, LYMPHORELPCT, MONORELPCT, EOSRELPCT, BASORELPCT, AUTOIGPER, NEUTROABS, LYMPHSABS, MONOSABS, EOSABS, BASOSABS, AUTOIGNUM, NRBC    Lab Results   Component Value Date    HGBA1C 5.9 (H) 08/11/2022       Lab Results   Component Value Date    OAOVJYBV70 1,032 01/20/2022       TSH   Date Value Ref Range Status   09/24/2019 2.640 0.270 - 4.200 uIU/mL Final       No results found for: CHOL  Lab Results   Component Value Date    TRIG 63 10/05/2021     Lab Results   Component Value Date    HDL 50 10/05/2021     Lab Results   Component Value Date    LDL 62 10/05/2021     Lab  Results   Component Value Date    VLDL 13 10/05/2021     No results found for: LDLHDL      Procedures    Assessment & Plan   Problems Addressed this Visit     B12 deficiency    Relevant Orders    Vitamin B12    Hyperlipidemia    Relevant Orders    Comprehensive Metabolic Panel    Lipid Panel With / Chol / HDL Ratio    Hypertension, essential    Relevant Orders    Comprehensive Metabolic Panel    Type 2 diabetes mellitus with diabetic polyneuropathy, with long-term current use of insulin (HCC) - Primary    Relevant Medications    insulin aspart (NovoLOG FlexPen) 100 UNIT/ML solution pen-injector sc pen    Other Relevant Orders    Hemoglobin A1c    Vitamin D deficiency    Relevant Orders    Vitamin D,25-Hydroxy   Diagnoses       Codes Comments    Type 2 diabetes mellitus with diabetic polyneuropathy, with long-term current use of insulin (HCC)    -  Primary ICD-10-CM: E11.42, Z79.4  ICD-9-CM: 250.60, 357.2, V58.67     Hypertension, essential     ICD-10-CM: I10  ICD-9-CM: 401.9     Mixed hyperlipidemia     ICD-10-CM: E78.2  ICD-9-CM: 272.2     B12 deficiency     ICD-10-CM: E53.8  ICD-9-CM: 266.2     Vitamin D deficiency     ICD-10-CM: E55.9  ICD-9-CM: 268.9         DM2.  Controlled.  Check A1c, CMP, FLP.  RF novolog.    HTN.  Controlled.  Check CMP.  Continue carvedilol, amlo, hydralazine, lisinopril.    Orders Placed This Encounter   Procedures   • Comprehensive Metabolic Panel     Order Specific Question:   Release to patient     Answer:   Routine Release   • Hemoglobin A1c     Order Specific Question:   Release to patient     Answer:   Routine Release   • Lipid Panel With / Chol / HDL Ratio     Order Specific Question:   Release to patient     Answer:   Routine Release   • Vitamin B12     Order Specific Question:   Release to patient     Answer:   Routine Release   • Vitamin D,25-Hydroxy     Order Specific Question:   Release to patient     Answer:   Routine Release       Current Outpatient Medications   Medication  "Sig Dispense Refill   • amLODIPine (NORVASC) 5 MG tablet TAKE 1 TABLET BY MOUTH EVERY DAY 90 tablet 3   • calcitriol (ROCALTROL) 0.25 MCG capsule Take 1 capsule by mouth Every Other Day.     • carvedilol (COREG) 6.25 MG tablet Take 6.25 mg by mouth 2 (Two) Times a Day.     • cholecalciferol (VITAMIN D3) 25 MCG (1000 UT) tablet Take 1,000 Units by mouth 2 (two) times a day.     • cyanocobalamin (VITAMIN B-12) 1000 MCG tablet Take 1,000 mcg by mouth.     • glucose blood (OneTouch Ultra) test strip Use as instructed to check TID before meals. 300 each 3   • haloperidol (HALDOL) 5 MG tablet Take 5 mg by mouth 2 (Two) Times a Day.  0   • hydrALAZINE (APRESOLINE) 100 MG tablet Take 1 tablet by mouth 2 (Two) Times a Day. 180 tablet 3   • insulin aspart (NovoLOG FlexPen) 100 UNIT/ML solution pen-injector sc pen Inject 4 Units under the skin into the appropriate area as directed 3 (Three) Times a Day With Meals. 15 mL 3   • insulin degludec (Tresiba FlexTouch) 100 UNIT/ML solution pen-injector injection 18 units SQ a day 5 pen 5   • Insulin Pen Needle (Pen Needles 3/16\") 31G X 5 MM misc Inject insulin 4 times a day as directed 200 each 5   • lisinopril (PRINIVIL,ZESTRIL) 40 MG tablet TAKE 1 TABLET BY MOUTH EVERY DAY 90 tablet 2   • nortriptyline (PAMELOR) 50 MG capsule Take 2 capsules by mouth Every Night.     • pravastatin (PRAVACHOL) 20 MG tablet TAKE 1 TABLET BY MOUTH EVERY DAY 90 tablet 3   • tamsulosin (FLOMAX) 0.4 MG capsule 24 hr capsule TAKE 1 CAPSULE BY MOUTH EVERY DAY 90 capsule 3   • topiramate (TOPAMAX) 25 MG tablet Take 2 tablets by mouth Every Night.       No current facility-administered medications for this visit.       Krish Castro had no medications administered during this visit.    Return in about 4 months (around 4/15/2023).    There are no Patient Instructions on file for this visit.       "

## 2022-12-16 LAB
25(OH)D3+25(OH)D2 SERPL-MCNC: 59.2 NG/ML (ref 30–100)
ALBUMIN SERPL-MCNC: 4.1 G/DL (ref 3.5–5.2)
ALBUMIN/GLOB SERPL: 2.2 G/DL
ALP SERPL-CCNC: 137 U/L (ref 39–117)
ALT SERPL-CCNC: 25 U/L (ref 1–41)
AST SERPL-CCNC: 12 U/L (ref 1–40)
BILIRUB SERPL-MCNC: 0.3 MG/DL (ref 0–1.2)
BUN SERPL-MCNC: 18 MG/DL (ref 8–23)
BUN/CREAT SERPL: 12.4 (ref 7–25)
CALCIUM SERPL-MCNC: 8.7 MG/DL (ref 8.6–10.5)
CHLORIDE SERPL-SCNC: 105 MMOL/L (ref 98–107)
CHOLEST SERPL-MCNC: 145 MG/DL (ref 0–200)
CHOLEST/HDLC SERPL: 2.34 {RATIO}
CO2 SERPL-SCNC: 30.5 MMOL/L (ref 22–29)
CREAT SERPL-MCNC: 1.45 MG/DL (ref 0.76–1.27)
EGFRCR SERPLBLD CKD-EPI 2021: 50.9 ML/MIN/1.73
GLOBULIN SER CALC-MCNC: 1.9 GM/DL
GLUCOSE SERPL-MCNC: 141 MG/DL (ref 65–99)
HBA1C MFR BLD: 5.6 % (ref 4.8–5.6)
HDLC SERPL-MCNC: 62 MG/DL (ref 40–60)
LDLC SERPL CALC-MCNC: 71 MG/DL (ref 0–100)
POTASSIUM SERPL-SCNC: 4.7 MMOL/L (ref 3.5–5.2)
PROT SERPL-MCNC: 6 G/DL (ref 6–8.5)
SODIUM SERPL-SCNC: 140 MMOL/L (ref 136–145)
TRIGL SERPL-MCNC: 56 MG/DL (ref 0–150)
VIT B12 SERPL-MCNC: 1332 PG/ML (ref 211–946)
VLDLC SERPL CALC-MCNC: 12 MG/DL (ref 5–40)

## 2023-02-25 DIAGNOSIS — I10 HYPERTENSION, ESSENTIAL: ICD-10-CM

## 2023-02-26 RX ORDER — AMLODIPINE BESYLATE 5 MG/1
TABLET ORAL
Qty: 90 TABLET | Refills: 3 | Status: SHIPPED | OUTPATIENT
Start: 2023-02-26

## 2023-04-08 DIAGNOSIS — E11.42 TYPE 2 DIABETES MELLITUS WITH DIABETIC POLYNEUROPATHY, WITH LONG-TERM CURRENT USE OF INSULIN: ICD-10-CM

## 2023-04-08 DIAGNOSIS — Z79.4 TYPE 2 DIABETES MELLITUS WITH DIABETIC POLYNEUROPATHY, WITH LONG-TERM CURRENT USE OF INSULIN: ICD-10-CM

## 2023-04-17 ENCOUNTER — OFFICE VISIT (OUTPATIENT)
Dept: FAMILY MEDICINE CLINIC | Facility: CLINIC | Age: 73
End: 2023-04-17
Payer: MEDICARE

## 2023-04-17 VITALS
HEIGHT: 72 IN | TEMPERATURE: 98.9 F | SYSTOLIC BLOOD PRESSURE: 134 MMHG | BODY MASS INDEX: 27.77 KG/M2 | HEART RATE: 57 BPM | WEIGHT: 205 LBS | DIASTOLIC BLOOD PRESSURE: 63 MMHG | OXYGEN SATURATION: 97 %

## 2023-04-17 DIAGNOSIS — E11.22 CKD STAGE 3 SECONDARY TO DIABETES: Primary | ICD-10-CM

## 2023-04-17 DIAGNOSIS — Z79.4 TYPE 2 DIABETES MELLITUS WITH DIABETIC POLYNEUROPATHY, WITH LONG-TERM CURRENT USE OF INSULIN: ICD-10-CM

## 2023-04-17 DIAGNOSIS — N18.30 CKD STAGE 3 SECONDARY TO DIABETES: Primary | ICD-10-CM

## 2023-04-17 DIAGNOSIS — E11.42 TYPE 2 DIABETES MELLITUS WITH DIABETIC POLYNEUROPATHY, WITH LONG-TERM CURRENT USE OF INSULIN: ICD-10-CM

## 2023-04-17 DIAGNOSIS — I10 HYPERTENSION, ESSENTIAL: ICD-10-CM

## 2023-04-17 LAB — HBA1C MFR BLD: 5.5 %

## 2023-04-17 RX ORDER — HYDRALAZINE HYDROCHLORIDE 100 MG/1
100 TABLET, FILM COATED ORAL
COMMUNITY
Start: 2022-11-28 | End: 2023-11-28

## 2023-04-17 RX ORDER — INSULIN DEGLUDEC INJECTION 100 U/ML
INJECTION, SOLUTION SUBCUTANEOUS
Qty: 15 ML | Refills: 5 | Status: SHIPPED | OUTPATIENT
Start: 2023-04-17

## 2023-04-17 NOTE — PROGRESS NOTES
"The ABCs of the Annual Wellness Visit  Subsequent Medicare Wellness Visit    Subjective    Krish Castro is a 73 y.o. male who presents for a Subsequent Medicare Wellness Visit.    The following portions of the patient's history were reviewed and   updated as appropriate: allergies, current medications, past family history, past medical history, past social history, past surgical history and problem list.    Compared to one year ago, the patient feels his physical   health is the same.    Compared to one year ago, the patient feels his mental   health is the same.    Recent Hospitalizations:  He was not admitted to the hospital during the last year.       Current Medical Providers:  Patient Care Team:  Dimitris David MD as PCP - General (Family Medicine)    Outpatient Medications Prior to Visit   Medication Sig Dispense Refill   • amLODIPine (NORVASC) 5 MG tablet TAKE 1 TABLET BY MOUTH EVERY DAY 90 tablet 3   • calcitriol (ROCALTROL) 0.25 MCG capsule Take 1 capsule by mouth Every Other Day.     • carvedilol (COREG) 6.25 MG tablet Take 1 tablet by mouth 2 (Two) Times a Day.     • cholecalciferol (VITAMIN D3) 25 MCG (1000 UT) tablet Take 1 tablet by mouth 2 (two) times a day.     • Cholecalciferol 50 MCG (2000 UT) tablet Take 1 tablet by mouth.     • cyanocobalamin (VITAMIN B-12) 1000 MCG tablet Take 1 tablet by mouth.     • glucose blood (OneTouch Ultra) test strip USE AS INSTRUCTED TO CHECK 3 TIMES A DAY BEFORE MEALS. 300 each 3   • haloperidol (HALDOL) 5 MG tablet Take 1 tablet by mouth 2 (Two) Times a Day.  0   • hydrALAZINE (APRESOLINE) 100 MG tablet Take 1 tablet by mouth.     • insulin aspart (NovoLOG FlexPen) 100 UNIT/ML solution pen-injector sc pen Inject 4 Units under the skin into the appropriate area as directed 3 (Three) Times a Day With Meals. 15 mL 3   • Insulin Pen Needle (Pen Needles 3/16\") 31G X 5 MM misc Inject insulin 4 times a day as directed 200 each 5   • lisinopril (PRINIVIL,ZESTRIL) 40 MG " tablet TAKE 1 TABLET BY MOUTH EVERY DAY 90 tablet 2   • nortriptyline (PAMELOR) 50 MG capsule Take 2 capsules by mouth Every Night.     • pravastatin (PRAVACHOL) 20 MG tablet TAKE 1 TABLET BY MOUTH EVERY DAY 90 tablet 3   • tamsulosin (FLOMAX) 0.4 MG capsule 24 hr capsule TAKE 1 CAPSULE BY MOUTH EVERY DAY 90 capsule 3   • topiramate (TOPAMAX) 25 MG tablet Take 2 tablets by mouth Every Night.     • hydrALAZINE (APRESOLINE) 100 MG tablet Take 1 tablet by mouth 2 (Two) Times a Day. 180 tablet 3   • insulin degludec (Tresiba FlexTouch) 100 UNIT/ML solution pen-injector injection 18 units SQ a day 5 pen 5     No facility-administered medications prior to visit.       No opioid medication identified on active medication list. I have reviewed chart for other potential  high risk medication/s and harmful drug interactions in the elderly.          Aspirin is not on active medication list.  Aspirin use is not indicated based on review of current medical condition/s. Risk of harm outweighs potential benefits.  .    Patient Active Problem List   Diagnosis   • Seasonal allergic rhinitis due to pollen   • BPH (benign prostatic hyperplasia)   • Constipation   • Hypertension, essential   • Depression   • Diabetic peripheral neuropathy   • Hyperlipidemia   • Type 2 diabetes mellitus with diabetic polyneuropathy, with long-term current use of insulin   • Medicare annual wellness visit, subsequent   • CKD stage 3 secondary to diabetes   • Thyroid disorder   • Vitamin D deficiency   • B12 deficiency   • Encounter for hepatitis C screening test for low risk patient     Advance Care Planning   Advance Care Planning     Advance Directive is not on file.  ACP discussion was held with the patient during this visit. Patient has an advance directive (not in EMR), copy requested.     Objective    Vitals:    04/17/23 1357   BP: 134/63   BP Location: Left arm   Patient Position: Sitting   Cuff Size: Adult   Pulse: 57   Temp: 98.9 °F (37.2 °C)  "  SpO2: 97%   Weight: 93 kg (205 lb)   Height: 182.9 cm (72.01\")     Estimated body mass index is 27.8 kg/m² as calculated from the following:    Height as of this encounter: 182.9 cm (72.01\").    Weight as of this encounter: 93 kg (205 lb).    BMI is >= 25 and <30. (Overweight) The following options were offered after discussion;: exercise counseling/recommendations      Does the patient have evidence of cognitive impairment? No    Lab Results   Component Value Date    HGBA1C 5.5 2023        HEALTH RISK ASSESSMENT    Smoking Status:  Social History     Tobacco Use   Smoking Status Former   • Packs/day: 1.00   • Years: 5.00   • Pack years: 5.00   • Types: Cigarettes   Smokeless Tobacco Never   Tobacco Comments    smoked less than 1 pack per day for less than 5 years     Alcohol Consumption:  Social History     Substance and Sexual Activity   Alcohol Use Yes    Comment: drinks alcohol, 7 or less drinks per week     Fall Risk Screen:    TAMIKO Fall Risk Assessment has not been completed.    Depression Screenin/17/2023     2:00 PM   PHQ-2/PHQ-9 Depression Screening   Little Interest or Pleasure in Doing Things 0-->not at all   PHQ-9: Brief Depression Severity Measure Score 0       Health Habits and Functional and Cognitive Screenin/17/2023     2:00 PM   Functional & Cognitive Status   Do you have difficulty preparing food and eating? No   Do you have difficulty bathing yourself, getting dressed or grooming yourself? No   Do you have difficulty using the toilet? No   Do you have difficulty moving around from place to place? No   Do you have trouble with steps or getting out of a bed or a chair? No   Current Diet Well Balanced Diet   Dental Exam Up to date   Eye Exam Up to date   Exercise (times per week) 3 times per week   Current Exercises Include Walking   Do you need help using the phone?  No   Are you deaf or do you have serious difficulty hearing?  No   Do you need help with transportation? " No   Do you need help shopping? No   Do you need help preparing meals?  No   Do you need help with housework?  No   Do you need help with laundry? No   Do you need help taking your medications? No   Do you need help managing money? No   Do you ever drive or ride in a car without wearing a seat belt? No   Have you felt unusual stress, anger or loneliness in the last month? No   Who do you live with? Alone   If you need help, do you have trouble finding someone available to you? No   Do you have difficulty concentrating, remembering or making decisions? No       Age-appropriate Screening Schedule:  Refer to the list below for future screening recommendations based on patient's age, sex and/or medical conditions. Orders for these recommended tests are listed in the plan section. The patient has been provided with a written plan.    Health Maintenance   Topic Date Due   • TDAP/TD VACCINES (2 - Tdap) 03/06/2007   • DIABETIC FOOT EXAM  Never done   • AAA SCREEN (ONE-TIME)  Never done   • INFLUENZA VACCINE  08/01/2023   • HEMOGLOBIN A1C  09/28/2023   • DIABETIC EYE EXAM  11/28/2023   • LIPID PANEL  12/15/2023   • ANNUAL WELLNESS VISIT  04/17/2024   • COLORECTAL CANCER SCREENING  12/24/2025   • HEPATITIS C SCREENING  Completed   • COVID-19 Vaccine  Completed   • Pneumococcal Vaccine 65+  Completed   • ZOSTER VACCINE  Completed   • URINE MICROALBUMIN  Discontinued                  CMS Preventative Services Quick Reference  Risk Factors Identified During Encounter  Inactivity/Sedentary: Patient was advised to exercise at least 150 minutes a week per CDC recommendations.  The above risks/problems have been discussed with the patient.  Pertinent information has been shared with the patient in the After Visit Summary.  An After Visit Summary and PPPS were made available to the patient.      Preventive Counseling:  Encouraged to stay active.  Covid vaccine UTD.  HEP A UTD.  Pneumovax UTD.  Colonoscopy UTD.  shingrix utd.   "  Dentist UTD.  Optho UTD.      Follow Up:   Next Medicare Wellness visit to be scheduled in 1 year.       Additional E&M Note during same encounter follows:  Patient has multiple medical problems which are significant and separately identifiable that require additional work above and beyond the Medicare Wellness Visit.      Chief Complaint  Diabetes    Subjective        HPI  Krish Castro is also being seen today for DM2.  [facility].           Objective   Vital Signs:  /63 (BP Location: Left arm, Patient Position: Sitting, Cuff Size: Adult)   Pulse 57   Temp 98.9 °F (37.2 °C)   Ht 182.9 cm (72.01\")   Wt 93 kg (205 lb)   SpO2 97%   BMI 27.80 kg/m²     Physical Exam                    Assessment and Plan   Diagnoses and all orders for this visit:    1. CKD stage 3 secondary to diabetes (Primary)    2. Type 2 diabetes mellitus with diabetic polyneuropathy, with long-term current use of insulin  -     insulin degludec (Tresiba FlexTouch) 100 UNIT/ML solution pen-injector injection; 14 units SQ a day  Dispense: 15 mL; Refill: 5    3. Hypertension, essential    Dm2.  Uncontrolled.  A1c 5.5 [facility].   so we can decrease to 14 units a day.  Rx sent.   HTN.  Controlled.  Continue meds.           Follow Up   Return in about 4 months (around 8/17/2023).  Patient was given instructions and counseling regarding his condition or for health maintenance advice. Please see specific information pulled into the AVS if appropriate.         "

## 2023-05-25 RX ORDER — PRAVASTATIN SODIUM 20 MG
TABLET ORAL
Qty: 90 TABLET | Refills: 3 | Status: SHIPPED | OUTPATIENT
Start: 2023-05-25

## 2023-08-16 ENCOUNTER — OFFICE VISIT (OUTPATIENT)
Dept: FAMILY MEDICINE CLINIC | Facility: CLINIC | Age: 73
End: 2023-08-16
Payer: MEDICARE

## 2023-08-16 VITALS
TEMPERATURE: 97.9 F | DIASTOLIC BLOOD PRESSURE: 68 MMHG | WEIGHT: 202 LBS | HEIGHT: 72 IN | SYSTOLIC BLOOD PRESSURE: 112 MMHG | HEART RATE: 56 BPM | BODY MASS INDEX: 27.36 KG/M2 | OXYGEN SATURATION: 99 %

## 2023-08-16 DIAGNOSIS — N40.0 BENIGN PROSTATIC HYPERPLASIA, UNSPECIFIED WHETHER LOWER URINARY TRACT SYMPTOMS PRESENT: ICD-10-CM

## 2023-08-16 DIAGNOSIS — I10 HYPERTENSION, ESSENTIAL: Primary | ICD-10-CM

## 2023-08-16 DIAGNOSIS — E11.42 TYPE 2 DIABETES MELLITUS WITH DIABETIC POLYNEUROPATHY, WITH LONG-TERM CURRENT USE OF INSULIN: ICD-10-CM

## 2023-08-16 DIAGNOSIS — E53.8 B12 DEFICIENCY: ICD-10-CM

## 2023-08-16 DIAGNOSIS — Z79.4 TYPE 2 DIABETES MELLITUS WITH DIABETIC POLYNEUROPATHY, WITH LONG-TERM CURRENT USE OF INSULIN: ICD-10-CM

## 2023-08-16 PROBLEM — F25.9 SCHIZOAFFECTIVE DISORDER: Status: ACTIVE | Noted: 2023-08-16

## 2023-08-16 RX ORDER — INSULIN ASPART 100 [IU]/ML
4 INJECTION, SOLUTION INTRAVENOUS; SUBCUTANEOUS
Qty: 15 ML | Refills: 3 | Status: SHIPPED | OUTPATIENT
Start: 2023-08-16

## 2023-08-16 NOTE — PROGRESS NOTES
Chief Complaint   Patient presents with    Hypertension    Diabetes       Jose Ramon Castro is a 73 y.o. male.     Hypertension  Pertinent negatives include no blurred vision, chest pain or shortness of breath.   Diabetes  Pertinent negatives for hypoglycemia include no confusion. Pertinent negatives for diabetes include no blurred vision, no chest pain, no fatigue, no polyuria and no weakness.    Htn f/u dOING WELL with meds.    F/U DM2.  Doing well with meds.    The following portions of the patient's history were reviewed and updated as appropriate: allergies, current medications, past family history, past medical history, past social history, past surgical history and problem list.    Review of Systems   Constitutional:  Negative for appetite change and fatigue.   HENT:  Negative for nosebleeds and sore throat.    Eyes:  Negative for blurred vision and visual disturbance.   Respiratory:  Negative for shortness of breath and wheezing.    Cardiovascular:  Negative for chest pain and leg swelling.   Gastrointestinal:  Negative for abdominal distention and abdominal pain.   Endocrine: Negative for cold intolerance and polyuria.   Genitourinary:  Negative for dysuria and hematuria.   Musculoskeletal:  Negative for arthralgias and myalgias.   Skin:  Negative for color change and rash.   Neurological:  Negative for weakness and confusion.   Psychiatric/Behavioral:  Negative for agitation and depressed mood.      Patient Active Problem List   Diagnosis    Seasonal allergic rhinitis due to pollen    BPH (benign prostatic hyperplasia)    Constipation    Hypertension, essential    Depression    Diabetic peripheral neuropathy    Hyperlipidemia    Type 2 diabetes mellitus with diabetic polyneuropathy, with long-term current use of insulin    Medicare annual wellness visit, subsequent    CKD stage 3 secondary to diabetes    Thyroid disorder    Vitamin D deficiency    B12 deficiency    Encounter for hepatitis C  "screening test for low risk patient    Schizoaffective disorder       Allergies   Allergen Reactions    Hydrocodone Itching and Other (See Comments)     Headaches/trembles         Current Outpatient Medications:     amLODIPine (NORVASC) 5 MG tablet, TAKE 1 TABLET BY MOUTH EVERY DAY, Disp: 90 tablet, Rfl: 3    cholecalciferol (VITAMIN D3) 25 MCG (1000 UT) tablet, Take 1 tablet by mouth 2 (two) times a day., Disp: , Rfl:     Cholecalciferol 50 MCG (2000 UT) tablet, Take 1 tablet by mouth., Disp: , Rfl:     cyanocobalamin (VITAMIN B-12) 1000 MCG tablet, Take 1 tablet by mouth., Disp: , Rfl:     glucose blood (OneTouch Ultra) test strip, USE AS INSTRUCTED TO CHECK 3 TIMES A DAY BEFORE MEALS., Disp: 300 each, Rfl: 3    haloperidol (HALDOL) 5 MG tablet, Take 1 tablet by mouth 2 (Two) Times a Day., Disp: , Rfl: 0    hydrALAZINE (APRESOLINE) 100 MG tablet, Take 1 tablet by mouth., Disp: , Rfl:     insulin aspart (NovoLOG FlexPen) 100 UNIT/ML solution pen-injector sc pen, Inject 4 Units under the skin into the appropriate area as directed 3 (Three) Times a Day With Meals., Disp: 15 mL, Rfl: 3    insulin degludec (Tresiba FlexTouch) 100 UNIT/ML solution pen-injector injection, 14 units SQ a day, Disp: 15 mL, Rfl: 5    Insulin Pen Needle (Pen Needles 3/16\") 31G X 5 MM misc, Inject insulin 4 times a day as directed, Disp: 200 each, Rfl: 5    lisinopril (PRINIVIL,ZESTRIL) 40 MG tablet, TAKE 1 TABLET BY MOUTH EVERY DAY, Disp: 90 tablet, Rfl: 2    nortriptyline (PAMELOR) 50 MG capsule, Take 2 capsules by mouth Every Night., Disp: , Rfl:     pravastatin (PRAVACHOL) 20 MG tablet, TAKE 1 TABLET BY MOUTH EVERY DAY, Disp: 90 tablet, Rfl: 3    tamsulosin (FLOMAX) 0.4 MG capsule 24 hr capsule, TAKE 1 CAPSULE BY MOUTH EVERY DAY (Patient not taking: Reported on 8/16/2023), Disp: 90 capsule, Rfl: 3    topiramate (TOPAMAX) 25 MG tablet, Take 2 tablets by mouth Every Night. (Patient not taking: Reported on 8/16/2023), Disp: , Rfl:     Past " Medical History:   Diagnosis Date    Allergic rhinitis     BPH (benign prostatic hyperplasia)     Chronic kidney disease (CKD) stage G2/A1, mildly decreased glomerular filtration rate (GFR) between 60-89 mL/min/1.73 square meter and albuminuria creatinine ratio less than 30 mg/g     Constipation     Constipation, chronic     Depression     Diabetes mellitus     Diabetic peripheral neuropathy     Former smoker     High blood pressure     High cholesterol     Hyperlipidemia     Hypertension, essential     Inflamed skin tag     Medicare annual wellness visit, subsequent     Prostate disease     Rash     Seasonal allergic rhinitis due to pollen     Stage 2 chronic kidney disease     Thyroid disorder     Tinea cruris     Type 1 diabetes mellitus     Vitamin D deficiency        Past Surgical History:   Procedure Laterality Date    COLONOSCOPY  12/24/20, 11/2015    3 polyps,  2 polyps    OTHER SURGICAL HISTORY      Sialolithotomy-Abstracted from [employer]       Family History   Problem Relation Age of Onset    Hyperlipidemia Mother     Colon cancer Mother     Hypertension Mother     Anxiety disorder Mother     Heart disease Mother     Diabetes type I Father     Colon cancer Brother     Diabetes type I Brother     Heart disease Other         In females before age 65, and males before age 55       Social History     Tobacco Use    Smoking status: Former     Packs/day: 1.00     Years: 5.00     Pack years: 5.00     Types: Cigarettes     Passive exposure: Never    Smokeless tobacco: Never    Tobacco comments:     smoked less than 1 pack per day for less than 5 years   Substance Use Topics    Alcohol use: Yes     Comment: drinks alcohol, 7 or less drinks per week            Objective     Vitals:    08/16/23 1411   BP: 112/68   Pulse: 56   Temp: 97.9 øF (36.6 øC)   SpO2: 99%     Body mass index is 27.39 kg/mý.    Physical Exam  Vitals reviewed.   Constitutional:       Appearance: He is well-developed. He is not diaphoretic.    HENT:      Head: Normocephalic and atraumatic.   Eyes:      General: No scleral icterus.     Pupils: Pupils are equal, round, and reactive to light.   Neck:      Thyroid: No thyromegaly.   Cardiovascular:      Rate and Rhythm: Normal rate and regular rhythm.      Heart sounds: No murmur heard.    No friction rub. No gallop.   Pulmonary:      Effort: Pulmonary effort is normal. No respiratory distress.      Breath sounds: No wheezing or rales.   Chest:      Chest wall: No tenderness.   Abdominal:      General: Bowel sounds are normal. There is no distension.      Palpations: Abdomen is soft.      Tenderness: There is no abdominal tenderness.   Musculoskeletal:         General: No deformity. Normal range of motion.   Lymphadenopathy:      Cervical: No cervical adenopathy.   Skin:     General: Skin is warm and dry.      Findings: No rash.   Neurological:      Cranial Nerves: No cranial nerve deficit.      Motor: No abnormal muscle tone.       Lab Results   Component Value Date    GLUCOSE 141 (H) 12/15/2022    BUN 18 12/15/2022    CREATININE 1.45 (H) 12/15/2022    EGFRIFNONA 39 (L) 01/20/2022    EGFRIFAFRI 45 (L) 01/20/2022    BCR 12.4 12/15/2022    K 4.7 12/15/2022    CO2 30.5 (H) 12/15/2022    CALCIUM 8.7 12/15/2022    PROTENTOTREF 6.0 12/15/2022    ALBUMIN 4.10 12/15/2022    LABIL2 2.2 12/15/2022    AST 12 12/15/2022    ALT 25 12/15/2022       No results found for: WBC, RBC, HGB, HCT, MCV, MCH, MCHC, RDW, RDWSD, MPV, PLT, NEUTRORELPCT, LYMPHORELPCT, MONORELPCT, EOSRELPCT, BASORELPCT, AUTOIGPER, NEUTROABS, LYMPHSABS, MONOSABS, EOSABS, BASOSABS, AUTOIGNUM, NRBC    Lab Results   Component Value Date    HGBA1C 5.5 03/28/2023       Lab Results   Component Value Date    NLTERBSP90 1,332 (H) 12/15/2022       TSH   Date Value Ref Range Status   09/24/2019 2.640 0.270 - 4.200 uIU/mL Final       No results found for: CHOL  Lab Results   Component Value Date    TRIG 56 12/15/2022     Lab Results   Component Value Date    HDL  62 (H) 12/15/2022     Lab Results   Component Value Date    LDL 71 12/15/2022     Lab Results   Component Value Date    VLDL 12 12/15/2022     No results found for: LDLHDL      Procedures    Assessment & Plan   Problems Addressed this Visit       B12 deficiency    BPH (benign prostatic hyperplasia)    Relevant Orders    PSA DIAGNOSTIC    Hypertension, essential - Primary    Type 2 diabetes mellitus with diabetic polyneuropathy, with long-term current use of insulin    Relevant Medications    insulin aspart (NovoLOG FlexPen) 100 UNIT/ML solution pen-injector sc pen    Other Relevant Orders    Hemoglobin A1c    Comprehensive Metabolic Panel    Microalbumin / Creatinine Urine Ratio - Urine, Clean Catch     Diagnoses         Codes Comments    Hypertension, essential    -  Primary ICD-10-CM: I10  ICD-9-CM: 401.9     Type 2 diabetes mellitus with diabetic polyneuropathy, with long-term current use of insulin     ICD-10-CM: E11.42, Z79.4  ICD-9-CM: 250.60, 357.2, V58.67     B12 deficiency     ICD-10-CM: E53.8  ICD-9-CM: 266.2     Benign prostatic hyperplasia, unspecified whether lower urinary tract symptoms present     ICD-10-CM: N40.0  ICD-9-CM: 600.00         HTN.  Controlled.  Continue meds.  Check  CMP.  FLP okay end of 2022.    DM2.  Chronic, controlled, Check A1c,  RF novolog.     Orders Placed This Encounter   Procedures    Pneumococcal Conjugate Vaccine 20-Valent All    Hemoglobin A1c     Order Specific Question:   Release to patient     Answer:   Routine Release [1400000002]    Comprehensive Metabolic Panel     Order Specific Question:   Release to patient     Answer:   Routine Release [1400000002]    PSA DIAGNOSTIC     Order Specific Question:   Release to patient     Answer:   Routine Release [1400000002]    Microalbumin / Creatinine Urine Ratio - Urine, Clean Catch     Order Specific Question:   Release to patient     Answer:   Routine Release [1400000002]       Current Outpatient Medications   Medication Sig  "Dispense Refill    amLODIPine (NORVASC) 5 MG tablet TAKE 1 TABLET BY MOUTH EVERY DAY 90 tablet 3    cholecalciferol (VITAMIN D3) 25 MCG (1000 UT) tablet Take 1 tablet by mouth 2 (two) times a day.      Cholecalciferol 50 MCG (2000 UT) tablet Take 1 tablet by mouth.      cyanocobalamin (VITAMIN B-12) 1000 MCG tablet Take 1 tablet by mouth.      glucose blood (OneTouch Ultra) test strip USE AS INSTRUCTED TO CHECK 3 TIMES A DAY BEFORE MEALS. 300 each 3    haloperidol (HALDOL) 5 MG tablet Take 1 tablet by mouth 2 (Two) Times a Day.  0    hydrALAZINE (APRESOLINE) 100 MG tablet Take 1 tablet by mouth.      insulin aspart (NovoLOG FlexPen) 100 UNIT/ML solution pen-injector sc pen Inject 4 Units under the skin into the appropriate area as directed 3 (Three) Times a Day With Meals. 15 mL 3    insulin degludec (Tresiba FlexTouch) 100 UNIT/ML solution pen-injector injection 14 units SQ a day 15 mL 5    Insulin Pen Needle (Pen Needles 3/16\") 31G X 5 MM misc Inject insulin 4 times a day as directed 200 each 5    lisinopril (PRINIVIL,ZESTRIL) 40 MG tablet TAKE 1 TABLET BY MOUTH EVERY DAY 90 tablet 2    nortriptyline (PAMELOR) 50 MG capsule Take 2 capsules by mouth Every Night.      pravastatin (PRAVACHOL) 20 MG tablet TAKE 1 TABLET BY MOUTH EVERY DAY 90 tablet 3    tamsulosin (FLOMAX) 0.4 MG capsule 24 hr capsule TAKE 1 CAPSULE BY MOUTH EVERY DAY (Patient not taking: Reported on 8/16/2023) 90 capsule 3    topiramate (TOPAMAX) 25 MG tablet Take 2 tablets by mouth Every Night. (Patient not taking: Reported on 8/16/2023)       No current facility-administered medications for this visit.       Krish Castro had no medications administered during this visit.    Return in about 4 months (around 12/16/2023).    There are no Patient Instructions on file for this visit.       "

## 2023-08-17 LAB
ALBUMIN SERPL-MCNC: 4.3 G/DL (ref 3.5–5.2)
ALBUMIN/CREAT UR: 2 MG/G CREAT (ref 0–29)
ALBUMIN/GLOB SERPL: 2.3 G/DL
ALP SERPL-CCNC: 103 U/L (ref 39–117)
ALT SERPL-CCNC: 20 U/L (ref 1–41)
AST SERPL-CCNC: 8 U/L (ref 1–40)
BILIRUB SERPL-MCNC: 0.5 MG/DL (ref 0–1.2)
BUN SERPL-MCNC: 20 MG/DL (ref 8–23)
BUN/CREAT SERPL: 11.3 (ref 7–25)
CALCIUM SERPL-MCNC: 9.5 MG/DL (ref 8.6–10.5)
CHLORIDE SERPL-SCNC: 106 MMOL/L (ref 98–107)
CO2 SERPL-SCNC: 27.6 MMOL/L (ref 22–29)
CREAT SERPL-MCNC: 1.77 MG/DL (ref 0.76–1.27)
CREAT UR-MCNC: 208.6 MG/DL
EGFRCR SERPLBLD CKD-EPI 2021: 39.8 ML/MIN/1.73
GLOBULIN SER CALC-MCNC: 1.9 GM/DL
GLUCOSE SERPL-MCNC: 100 MG/DL (ref 65–99)
HBA1C MFR BLD: 6 % (ref 4.8–5.6)
MICROALBUMIN UR-MCNC: 4.9 UG/ML
POTASSIUM SERPL-SCNC: 5 MMOL/L (ref 3.5–5.2)
PROT SERPL-MCNC: 6.2 G/DL (ref 6–8.5)
PSA SERPL-MCNC: 0.61 NG/ML (ref 0–4)
SODIUM SERPL-SCNC: 140 MMOL/L (ref 136–145)

## 2023-11-24 DIAGNOSIS — I10 HYPERTENSION, ESSENTIAL: ICD-10-CM

## 2023-11-24 RX ORDER — LISINOPRIL 40 MG/1
TABLET ORAL
Qty: 90 TABLET | Refills: 0 | Status: SHIPPED | OUTPATIENT
Start: 2023-11-24

## 2023-11-24 NOTE — TELEPHONE ENCOUNTER
Rx Refill Note  Requested Prescriptions     Pending Prescriptions Disp Refills    lisinopril (PRINIVIL,ZESTRIL) 40 MG tablet [Pharmacy Med Name: LISINOPRIL 40 MG TABLET] 90 tablet 2     Sig: TAKE 1 TABLET BY MOUTH EVERY DAY      Last office visit with prescribing clinician: 8/16/2023   Last telemedicine visit with prescribing clinician: Visit date not found   Next office visit with prescribing clinician: 12/12/2023                         Would you like a call back once the refill request has been completed: [] Yes [] No    If the office needs to give you a call back, can they leave a voicemail: [] Yes [] No    Adrienne Antoine MA  11/24/23, 10:01 EST

## 2023-12-12 ENCOUNTER — OFFICE VISIT (OUTPATIENT)
Dept: FAMILY MEDICINE CLINIC | Facility: CLINIC | Age: 73
End: 2023-12-12
Payer: MEDICARE

## 2023-12-12 VITALS
HEART RATE: 64 BPM | OXYGEN SATURATION: 100 % | BODY MASS INDEX: 27.33 KG/M2 | TEMPERATURE: 97.5 F | HEIGHT: 72 IN | WEIGHT: 201.8 LBS | DIASTOLIC BLOOD PRESSURE: 70 MMHG | SYSTOLIC BLOOD PRESSURE: 134 MMHG

## 2023-12-12 DIAGNOSIS — E78.2 MIXED HYPERLIPIDEMIA: Primary | ICD-10-CM

## 2023-12-12 DIAGNOSIS — Z79.4 TYPE 2 DIABETES MELLITUS WITH DIABETIC POLYNEUROPATHY, WITH LONG-TERM CURRENT USE OF INSULIN: ICD-10-CM

## 2023-12-12 DIAGNOSIS — N40.0 BENIGN PROSTATIC HYPERPLASIA, UNSPECIFIED WHETHER LOWER URINARY TRACT SYMPTOMS PRESENT: ICD-10-CM

## 2023-12-12 DIAGNOSIS — E11.42 TYPE 2 DIABETES MELLITUS WITH DIABETIC POLYNEUROPATHY, WITH LONG-TERM CURRENT USE OF INSULIN: ICD-10-CM

## 2023-12-12 DIAGNOSIS — I10 HYPERTENSION, ESSENTIAL: ICD-10-CM

## 2023-12-12 PROCEDURE — 99214 OFFICE O/P EST MOD 30 MIN: CPT | Performed by: FAMILY MEDICINE

## 2023-12-12 PROCEDURE — 3078F DIAST BP <80 MM HG: CPT | Performed by: FAMILY MEDICINE

## 2023-12-12 PROCEDURE — 3044F HG A1C LEVEL LT 7.0%: CPT | Performed by: FAMILY MEDICINE

## 2023-12-12 PROCEDURE — 3075F SYST BP GE 130 - 139MM HG: CPT | Performed by: FAMILY MEDICINE

## 2023-12-12 RX ORDER — TAMSULOSIN HYDROCHLORIDE 0.4 MG/1
1 CAPSULE ORAL DAILY
Qty: 90 CAPSULE | Refills: 3 | Status: SHIPPED | OUTPATIENT
Start: 2023-12-12

## 2023-12-12 RX ORDER — BLOOD SUGAR DIAGNOSTIC
STRIP MISCELLANEOUS
Qty: 300 EACH | Refills: 3 | Status: SHIPPED | OUTPATIENT
Start: 2023-12-12

## 2023-12-12 NOTE — PROGRESS NOTES
Chief Complaint   Patient presents with    Hypertension    Diabetes       Jose Ramon Castro is a 73 y.o. male.     Hypertension  Pertinent negatives include no blurred vision, chest pain or shortness of breath.   Diabetes  Pertinent negatives for hypoglycemia include no confusion. Pertinent negatives for diabetes include no blurred vision, no chest pain, no fatigue, no polyuria and no weakness.      F/U HTN.  Doing well with meds.   F/U DM2.  Checks BS 3 times a day .  On insulin regulalry.   F/U hyperlipidmeia.  No myalgias.  Doing well with meds.     The following portions of the patient's history were reviewed and updated as appropriate: allergies, current medications, past family history, past medical history, past social history, past surgical history and problem list.    Review of Systems   Constitutional:  Negative for appetite change and fatigue.   HENT:  Negative for nosebleeds and sore throat.    Eyes:  Negative for blurred vision and visual disturbance.   Respiratory:  Negative for shortness of breath and wheezing.    Cardiovascular:  Negative for chest pain and leg swelling.   Gastrointestinal:  Negative for abdominal distention and abdominal pain.   Endocrine: Negative for cold intolerance and polyuria.   Genitourinary:  Negative for dysuria and hematuria.   Musculoskeletal:  Negative for arthralgias and myalgias.   Skin:  Negative for color change and rash.   Neurological:  Negative for weakness and confusion.   Psychiatric/Behavioral:  Negative for agitation and depressed mood.        Patient Active Problem List   Diagnosis    Seasonal allergic rhinitis due to pollen    BPH (benign prostatic hyperplasia)    Constipation    Hypertension, essential    Depression    Diabetic peripheral neuropathy    Hyperlipidemia    Type 2 diabetes mellitus with diabetic polyneuropathy, with long-term current use of insulin    Medicare annual wellness visit, subsequent    CKD stage 3 secondary to diabetes     "Thyroid disorder    Vitamin D deficiency    B12 deficiency    Encounter for hepatitis C screening test for low risk patient    Schizoaffective disorder       Allergies   Allergen Reactions    Hydrocodone Itching and Other (See Comments)     Headaches/trembles         Current Outpatient Medications:     amLODIPine (NORVASC) 5 MG tablet, TAKE 1 TABLET BY MOUTH EVERY DAY, Disp: 90 tablet, Rfl: 3    cholecalciferol (VITAMIN D3) 25 MCG (1000 UT) tablet, Take 1 tablet by mouth Daily., Disp: , Rfl:     cyanocobalamin (VITAMIN B-12) 1000 MCG tablet, Take 1 tablet by mouth., Disp: , Rfl:     glucose blood (OneTouch Ultra) test strip, USE AS INSTRUCTED TO CHECK 3 TIMES A DAY BEFORE MEALS., Disp: 300 each, Rfl: 3    haloperidol (HALDOL) 5 MG tablet, Take 1 tablet by mouth 2 (Two) Times a Day., Disp: , Rfl: 0    insulin aspart (NovoLOG FlexPen) 100 UNIT/ML solution pen-injector sc pen, Inject 4 Units under the skin into the appropriate area as directed 3 (Three) Times a Day With Meals., Disp: 15 mL, Rfl: 3    insulin degludec (Tresiba FlexTouch) 100 UNIT/ML solution pen-injector injection, 14 units SQ a day, Disp: 15 mL, Rfl: 5    Insulin Pen Needle (Pen Needles 3/16\") 31G X 5 MM misc, Inject insulin 4 times a day as directed, Disp: 200 each, Rfl: 5    lisinopril (PRINIVIL,ZESTRIL) 40 MG tablet, TAKE 1 TABLET BY MOUTH EVERY DAY, Disp: 90 tablet, Rfl: 0    nortriptyline (PAMELOR) 50 MG capsule, Take 2 capsules by mouth Every Night., Disp: , Rfl:     pravastatin (PRAVACHOL) 20 MG tablet, TAKE 1 TABLET BY MOUTH EVERY DAY, Disp: 90 tablet, Rfl: 3    tamsulosin (FLOMAX) 0.4 MG capsule 24 hr capsule, Take 1 capsule by mouth Daily., Disp: 90 capsule, Rfl: 3    topiramate (TOPAMAX) 25 MG tablet, Take 2 tablets by mouth Every Night., Disp: , Rfl:     hydrALAZINE (APRESOLINE) 100 MG tablet, Take 1 tablet by mouth., Disp: , Rfl:     Past Medical History:   Diagnosis Date    Allergic rhinitis     BPH (benign prostatic hyperplasia)     " Chronic kidney disease (CKD) stage G2/A1, mildly decreased glomerular filtration rate (GFR) between 60-89 mL/min/1.73 square meter and albuminuria creatinine ratio less than 30 mg/g     Constipation     Constipation, chronic     Depression     Diabetes mellitus     Diabetic peripheral neuropathy     Former smoker     High blood pressure     High cholesterol     Hyperlipidemia     Hypertension, essential     Inflamed skin tag     Medicare annual wellness visit, subsequent     Prostate disease     Rash     Seasonal allergic rhinitis due to pollen     Stage 2 chronic kidney disease     Thyroid disorder     Tinea cruris     Type 1 diabetes mellitus     Vitamin D deficiency        Past Surgical History:   Procedure Laterality Date    COLONOSCOPY  12/24/20, 11/2015    3 polyps,  2 polyps    OTHER SURGICAL HISTORY      Sialolithotomy-Abstracted from [employer]       Family History   Problem Relation Age of Onset    Hyperlipidemia Mother     Colon cancer Mother     Hypertension Mother     Anxiety disorder Mother     Heart disease Mother     Diabetes type I Father     Colon cancer Brother     Diabetes type I Brother     Heart disease Other         In females before age 65, and males before age 55       Social History     Tobacco Use    Smoking status: Former     Packs/day: 1.00     Years: 5.00     Additional pack years: 0.00     Total pack years: 5.00     Types: Cigarettes     Passive exposure: Never    Smokeless tobacco: Never    Tobacco comments:     smoked less than 1 pack per day for less than 5 years   Substance Use Topics    Alcohol use: Yes     Comment: drinks alcohol, 7 or less drinks per week            Objective     Vitals:    12/12/23 1341   BP: 134/70   Pulse: 64   Temp: 97.5 °F (36.4 °C)   SpO2: 100%     Body mass index is 27.37 kg/m².    Physical Exam  Vitals reviewed.   Constitutional:       Appearance: He is well-developed. He is not diaphoretic.   HENT:      Head: Normocephalic and atraumatic.   Eyes:       "General: No scleral icterus.     Pupils: Pupils are equal, round, and reactive to light.   Neck:      Thyroid: No thyromegaly.   Cardiovascular:      Rate and Rhythm: Normal rate and regular rhythm.      Heart sounds: No murmur heard.     No friction rub. No gallop.   Pulmonary:      Effort: Pulmonary effort is normal. No respiratory distress.      Breath sounds: No wheezing or rales.   Chest:      Chest wall: No tenderness.   Abdominal:      General: Bowel sounds are normal. There is no distension.      Palpations: Abdomen is soft.      Tenderness: There is no abdominal tenderness.   Musculoskeletal:         General: No deformity. Normal range of motion.   Lymphadenopathy:      Cervical: No cervical adenopathy.   Skin:     General: Skin is warm and dry.      Findings: No rash.   Neurological:      Cranial Nerves: No cranial nerve deficit.      Motor: No abnormal muscle tone.         Lab Results   Component Value Date    GLUCOSE 100 (H) 08/16/2023    BUN 20 08/16/2023    CREATININE 1.77 (H) 08/16/2023    EGFRIFNONA 39 (L) 01/20/2022    EGFRIFAFRI 45 (L) 01/20/2022    BCR 11.3 08/16/2023    K 5.0 08/16/2023    CO2 27.6 08/16/2023    CALCIUM 9.5 08/16/2023    PROTENTOTREF 6.2 08/16/2023    ALBUMIN 4.3 08/16/2023    LABIL2 2.3 08/16/2023    AST 8 08/16/2023    ALT 20 08/16/2023       No results found for: \"WBC\", \"RBC\", \"HGB\", \"HCT\", \"MCV\", \"MCH\", \"MCHC\", \"RDW\", \"RDWSD\", \"MPV\", \"PLT\", \"NEUTRORELPCT\", \"LYMPHORELPCT\", \"MONORELPCT\", \"EOSRELPCT\", \"BASORELPCT\", \"AUTOIGPER\", \"NEUTROABS\", \"LYMPHSABS\", \"MONOSABS\", \"EOSABS\", \"BASOSABS\", \"AUTOIGNUM\", \"NRBC\"    Lab Results   Component Value Date    HGBA1C 6.00 (H) 08/16/2023       Lab Results   Component Value Date    WIJHFFPP95 1,332 (H) 12/15/2022       TSH   Date Value Ref Range Status   09/24/2019 2.640 0.270 - 4.200 uIU/mL Final       No results found for: \"CHOL\"  Lab Results   Component Value Date    TRIG 56 12/15/2022     Lab Results   Component Value Date    HDL 62 (H) " "12/15/2022     Lab Results   Component Value Date    LDL 71 12/15/2022     Lab Results   Component Value Date    VLDL 12 12/15/2022     No results found for: \"LDLHDL\"      Procedures    Assessment & Plan   Problems Addressed this Visit       BPH (benign prostatic hyperplasia)    Relevant Medications    tamsulosin (FLOMAX) 0.4 MG capsule 24 hr capsule    Hypertension, essential    Relevant Orders    Comprehensive Metabolic Panel    Hyperlipidemia - Primary    Relevant Orders    Comprehensive Metabolic Panel    Lipid Panel With / Chol / HDL Ratio    Type 2 diabetes mellitus with diabetic polyneuropathy, with long-term current use of insulin    Relevant Medications    glucose blood (OneTouch Ultra) test strip    Other Relevant Orders    Comprehensive Metabolic Panel    Lipid Panel With / Chol / HDL Ratio    Hemoglobin A1c     Diagnoses         Codes Comments    Mixed hyperlipidemia    -  Primary ICD-10-CM: E78.2  ICD-9-CM: 272.2     Type 2 diabetes mellitus with diabetic polyneuropathy, with long-term current use of insulin     ICD-10-CM: E11.42, Z79.4  ICD-9-CM: 250.60, 357.2, V58.67     Hypertension, essential     ICD-10-CM: I10  ICD-9-CM: 401.9     Benign prostatic hyperplasia, unspecified whether lower urinary tract symptoms present     ICD-10-CM: N40.0  ICD-9-CM: 600.00         DM2.  Controlled.  Continue  tresiba/novolog.  Check A1c.    Hyperlipidmeia.  Check FLP.  Continue pravastatin.    HTN.  Controlled.  Continue meds.  Check CMP.      Orders Placed This Encounter   Procedures    Comprehensive Metabolic Panel     Order Specific Question:   Release to patient     Answer:   Routine Release [[id number]]    Lipid Panel With / Chol / HDL Ratio     Order Specific Question:   Release to patient     Answer:   Routine Release [[id number]]    Hemoglobin A1c     Order Specific Question:   Release to patient     Answer:   Routine Release [[id number]]       Current Outpatient Medications   Medication Sig Dispense Refill " "   amLODIPine (NORVASC) 5 MG tablet TAKE 1 TABLET BY MOUTH EVERY DAY 90 tablet 3    cholecalciferol (VITAMIN D3) 25 MCG (1000 UT) tablet Take 1 tablet by mouth Daily.      cyanocobalamin (VITAMIN B-12) 1000 MCG tablet Take 1 tablet by mouth.      glucose blood (OneTouch Ultra) test strip USE AS INSTRUCTED TO CHECK 3 TIMES A DAY BEFORE MEALS. 300 each 3    haloperidol (HALDOL) 5 MG tablet Take 1 tablet by mouth 2 (Two) Times a Day.  0    insulin aspart (NovoLOG FlexPen) 100 UNIT/ML solution pen-injector sc pen Inject 4 Units under the skin into the appropriate area as directed 3 (Three) Times a Day With Meals. 15 mL 3    insulin degludec (Tresiba FlexTouch) 100 UNIT/ML solution pen-injector injection 14 units SQ a day 15 mL 5    Insulin Pen Needle (Pen Needles 3/16\") 31G X 5 MM misc Inject insulin 4 times a day as directed 200 each 5    lisinopril (PRINIVIL,ZESTRIL) 40 MG tablet TAKE 1 TABLET BY MOUTH EVERY DAY 90 tablet 0    nortriptyline (PAMELOR) 50 MG capsule Take 2 capsules by mouth Every Night.      pravastatin (PRAVACHOL) 20 MG tablet TAKE 1 TABLET BY MOUTH EVERY DAY 90 tablet 3    tamsulosin (FLOMAX) 0.4 MG capsule 24 hr capsule Take 1 capsule by mouth Daily. 90 capsule 3    topiramate (TOPAMAX) 25 MG tablet Take 2 tablets by mouth Every Night.      hydrALAZINE (APRESOLINE) 100 MG tablet Take 1 tablet by mouth.       No current facility-administered medications for this visit.       Krish Castro had no medications administered during this visit.    Return in about 4 months (around 4/18/2024) for Medicare wellness and OV, 30 minutes.    There are no Patient Instructions on file for this visit.       "

## 2023-12-13 LAB
ALBUMIN SERPL-MCNC: 3.9 G/DL (ref 3.5–5.2)
ALBUMIN/GLOB SERPL: 2.1 G/DL
ALP SERPL-CCNC: 106 U/L (ref 39–117)
ALT SERPL-CCNC: 19 U/L (ref 1–41)
AST SERPL-CCNC: 10 U/L (ref 1–40)
BILIRUB SERPL-MCNC: 0.3 MG/DL (ref 0–1.2)
BUN SERPL-MCNC: 21 MG/DL (ref 8–23)
BUN/CREAT SERPL: 13.7 (ref 7–25)
CALCIUM SERPL-MCNC: 9.4 MG/DL (ref 8.6–10.5)
CHLORIDE SERPL-SCNC: 107 MMOL/L (ref 98–107)
CHOLEST SERPL-MCNC: 132 MG/DL (ref 0–200)
CHOLEST/HDLC SERPL: 2.36 {RATIO}
CO2 SERPL-SCNC: 28.1 MMOL/L (ref 22–29)
CREAT SERPL-MCNC: 1.53 MG/DL (ref 0.76–1.27)
EGFRCR SERPLBLD CKD-EPI 2021: 47.4 ML/MIN/1.73
GLOBULIN SER CALC-MCNC: 1.9 GM/DL
GLUCOSE SERPL-MCNC: 107 MG/DL (ref 65–99)
HBA1C MFR BLD: 5.7 % (ref 4.8–5.6)
HDLC SERPL-MCNC: 56 MG/DL (ref 40–60)
LDLC SERPL CALC-MCNC: 62 MG/DL (ref 0–100)
POTASSIUM SERPL-SCNC: 4.9 MMOL/L (ref 3.5–5.2)
PROT SERPL-MCNC: 5.8 G/DL (ref 6–8.5)
SODIUM SERPL-SCNC: 141 MMOL/L (ref 136–145)
TRIGL SERPL-MCNC: 70 MG/DL (ref 0–150)
VLDLC SERPL CALC-MCNC: 14 MG/DL (ref 5–40)

## 2024-02-24 DIAGNOSIS — I10 HYPERTENSION, ESSENTIAL: ICD-10-CM

## 2024-02-24 RX ORDER — AMLODIPINE BESYLATE 5 MG/1
TABLET ORAL
Qty: 90 TABLET | Refills: 3 | Status: SHIPPED | OUTPATIENT
Start: 2024-02-24

## 2024-03-04 ENCOUNTER — TELEPHONE (OUTPATIENT)
Dept: FAMILY MEDICINE CLINIC | Facility: CLINIC | Age: 74
End: 2024-03-04
Payer: MEDICARE

## 2024-03-04 DIAGNOSIS — I10 HYPERTENSION, ESSENTIAL: ICD-10-CM

## 2024-03-04 RX ORDER — LISINOPRIL 40 MG/1
40 TABLET ORAL DAILY
Qty: 90 TABLET | Refills: 3 | Status: SHIPPED | OUTPATIENT
Start: 2024-03-04

## 2024-04-22 ENCOUNTER — OFFICE VISIT (OUTPATIENT)
Dept: FAMILY MEDICINE CLINIC | Facility: CLINIC | Age: 74
End: 2024-04-22
Payer: MEDICARE

## 2024-04-22 VITALS
WEIGHT: 206 LBS | HEART RATE: 68 BPM | RESPIRATION RATE: 17 BRPM | OXYGEN SATURATION: 98 % | DIASTOLIC BLOOD PRESSURE: 68 MMHG | BODY MASS INDEX: 27.9 KG/M2 | SYSTOLIC BLOOD PRESSURE: 120 MMHG | TEMPERATURE: 97.8 F | HEIGHT: 72 IN

## 2024-04-22 DIAGNOSIS — Z00.00 MEDICARE ANNUAL WELLNESS VISIT, SUBSEQUENT: ICD-10-CM

## 2024-04-22 DIAGNOSIS — I10 HYPERTENSION, ESSENTIAL: ICD-10-CM

## 2024-04-22 DIAGNOSIS — E78.2 MIXED HYPERLIPIDEMIA: ICD-10-CM

## 2024-04-22 DIAGNOSIS — E11.42 TYPE 2 DIABETES MELLITUS WITH DIABETIC POLYNEUROPATHY, WITH LONG-TERM CURRENT USE OF INSULIN: Primary | ICD-10-CM

## 2024-04-22 DIAGNOSIS — Z79.4 TYPE 2 DIABETES MELLITUS WITH DIABETIC POLYNEUROPATHY, WITH LONG-TERM CURRENT USE OF INSULIN: Primary | ICD-10-CM

## 2024-04-22 PROCEDURE — 3074F SYST BP LT 130 MM HG: CPT | Performed by: FAMILY MEDICINE

## 2024-04-22 PROCEDURE — 3078F DIAST BP <80 MM HG: CPT | Performed by: FAMILY MEDICINE

## 2024-04-22 PROCEDURE — 99214 OFFICE O/P EST MOD 30 MIN: CPT | Performed by: FAMILY MEDICINE

## 2024-04-22 PROCEDURE — 1170F FXNL STATUS ASSESSED: CPT | Performed by: FAMILY MEDICINE

## 2024-04-22 PROCEDURE — G0439 PPPS, SUBSEQ VISIT: HCPCS | Performed by: FAMILY MEDICINE

## 2024-04-22 RX ORDER — INSULIN DEGLUDEC 100 U/ML
INJECTION, SOLUTION SUBCUTANEOUS
Qty: 15 ML | Refills: 5 | Status: SHIPPED | OUTPATIENT
Start: 2024-04-22

## 2024-04-22 NOTE — PROGRESS NOTES
"The ABCs of the Annual Wellness Visit  Subsequent Medicare Wellness Visit    Subjective    Krish Castro is a 74 y.o. male who presents for a Subsequent Medicare Wellness Visit.    The following portions of the patient's history were reviewed and   updated as appropriate: allergies, current medications, past family history, past medical history, past social history, past surgical history, and problem list.    Compared to one year ago, the patient feels his physical   health is the same.    Compared to one year ago, the patient feels his mental   health is the same.    Recent Hospitalizations:  He was not admitted to the hospital during the last year.       Current Medical Providers:  Patient Care Team:  Dimitris David MD as PCP - General (Family Medicine)    Outpatient Medications Prior to Visit   Medication Sig Dispense Refill    amLODIPine (NORVASC) 5 MG tablet TAKE 1 TABLET BY MOUTH EVERY DAY 90 tablet 3    cholecalciferol (VITAMIN D3) 25 MCG (1000 UT) tablet Take 1 tablet by mouth Daily.      cyanocobalamin (VITAMIN B-12) 1000 MCG tablet Take 1 tablet by mouth.      glucose blood (OneTouch Ultra) test strip USE AS INSTRUCTED TO CHECK 3 TIMES A DAY BEFORE MEALS. 300 each 3    haloperidol (HALDOL) 5 MG tablet Take 1 tablet by mouth 2 (Two) Times a Day.  0    insulin aspart (NovoLOG FlexPen) 100 UNIT/ML solution pen-injector sc pen Inject 4 Units under the skin into the appropriate area as directed 3 (Three) Times a Day With Meals. 15 mL 3    Insulin Pen Needle (Pen Needles 3/16\") 31G X 5 MM misc Inject insulin 4 times a day as directed 200 each 5    lisinopril (PRINIVIL,ZESTRIL) 40 MG tablet Take 1 tablet by mouth Daily. 90 tablet 3    nortriptyline (PAMELOR) 50 MG capsule Take 2 capsules by mouth Every Night.      pravastatin (PRAVACHOL) 20 MG tablet TAKE 1 TABLET BY MOUTH EVERY DAY 90 tablet 3    tamsulosin (FLOMAX) 0.4 MG capsule 24 hr capsule Take 1 capsule by mouth Daily. 90 capsule 3    topiramate " "(TOPAMAX) 25 MG tablet Take 2 tablets by mouth Every Night.      insulin degludec (Tresiba FlexTouch) 100 UNIT/ML solution pen-injector injection 14 units SQ a day 15 mL 5    hydrALAZINE (APRESOLINE) 100 MG tablet Take 1 tablet by mouth.       No facility-administered medications prior to visit.       No opioid medication identified on active medication list. I have reviewed chart for other potential  high risk medication/s and harmful drug interactions in the elderly.        Aspirin is not on active medication list.  Aspirin use is not indicated based on review of current medical condition/s. Risk of harm outweighs potential benefits.  .    Patient Active Problem List   Diagnosis    Seasonal allergic rhinitis due to pollen    BPH (benign prostatic hyperplasia)    Constipation    Hypertension, essential    Depression    Diabetic peripheral neuropathy    Hyperlipidemia    Type 2 diabetes mellitus with diabetic polyneuropathy, with long-term current use of insulin    Medicare annual wellness visit, subsequent    CKD stage 3 secondary to diabetes    Thyroid disorder    Vitamin D deficiency    B12 deficiency    Encounter for hepatitis C screening test for low risk patient    Schizoaffective disorder     Advance Care Planning   Advance Care Planning     Advance Directive is not on file.  ACP discussion was held with the patient during this visit. Patient has an advance directive (not in EMR), copy requested.     Objective    Vitals:    04/22/24 1406   BP: 120/68   BP Location: Left arm   Patient Position: Sitting   Cuff Size: Small Adult   Pulse: 68   Resp: 17   Temp: 97.8 °F (36.6 °C)   TempSrc: Temporal   SpO2: 98%   Weight: 93.4 kg (206 lb)   Height: 182.9 cm (72.01\")     Estimated body mass index is 27.93 kg/m² as calculated from the following:    Height as of this encounter: 182.9 cm (72.01\").    Weight as of this encounter: 93.4 kg (206 lb).    BMI is >= 25 and <30. (Overweight) The following options were offered " after discussion;: exercise counseling/recommendations      Does the patient have evidence of cognitive impairment? No          HEALTH RISK ASSESSMENT    Smoking Status:  Social History     Tobacco Use   Smoking Status Former    Current packs/day: 1.00    Average packs/day: 1 pack/day for 5.0 years (5.0 ttl pk-yrs)    Types: Cigarettes    Passive exposure: Never   Smokeless Tobacco Never   Tobacco Comments    smoked less than 1 pack per day for less than 5 years     Alcohol Consumption:  Social History     Substance and Sexual Activity   Alcohol Use Yes    Comment: drinks alcohol, 7 or less drinks per week     Fall Risk Screen:    CASTILLOADI Fall Risk Assessment was completed, and patient is at LOW risk for falls.Assessment completed on:2024    Depression Screenin/22/2024     2:11 PM   PHQ-2/PHQ-9 Depression Screening   Little Interest or Pleasure in Doing Things 0-->not at all   Feeling Down, Depressed or Hopeless 0-->not at all   PHQ-9: Brief Depression Severity Measure Score 0       Health Habits and Functional and Cognitive Screenin/22/2024     2:11 PM   Functional & Cognitive Status   Do you have difficulty preparing food and eating? No   Do you have difficulty bathing yourself, getting dressed or grooming yourself? No   Do you have difficulty using the toilet? No   Do you have difficulty moving around from place to place? No   Do you have trouble with steps or getting out of a bed or a chair? No   Current Diet Well Balanced Diet   Dental Exam Up to date   Eye Exam Up to date   Exercise (times per week) 5 times per week   Current Exercises Include Walking;Bicycling Outdoors   Do you need help using the phone?  No   Are you deaf or do you have serious difficulty hearing?  No   Do you need help to go to places out of walking distance? No   Do you need help shopping? No   Do you need help preparing meals?  No   Do you need help with housework?  No   Do you need help with laundry? No   Do you  need help taking your medications? No   Do you need help managing money? No   Do you ever drive or ride in a car without wearing a seat belt? No   Have you felt unusual stress, anger or loneliness in the last month? No   Who do you live with? Alone   If you need help, do you have trouble finding someone available to you? No   Have you been bothered in the last four weeks by sexual problems? No   Do you have difficulty concentrating, remembering or making decisions? No       Age-appropriate Screening Schedule:  Refer to the list below for future screening recommendations based on patient's age, sex and/or medical conditions. Orders for these recommended tests are listed in the plan section. The patient has been provided with a written plan.    Health Maintenance   Topic Date Due    RSV Vaccine - Adults (1 - 1-dose 60+ series) Never done    DIABETIC FOOT EXAM  Never done    AAA SCREEN (ONE-TIME)  Never done    DIABETIC EYE EXAM  11/28/2023    COVID-19 Vaccine (7 - 2023-24 season) 12/15/2023    HEMOGLOBIN A1C  06/12/2024    INFLUENZA VACCINE  08/01/2024    URINE MICROALBUMIN  08/16/2024    LIPID PANEL  12/12/2024    ANNUAL WELLNESS VISIT  04/22/2025    BMI FOLLOWUP  04/22/2025    COLORECTAL CANCER SCREENING  12/24/2025    TDAP/TD VACCINES (3 - Td or Tdap) 11/28/2028    HEPATITIS C SCREENING  Completed    Pneumococcal Vaccine 65+  Completed    ZOSTER VACCINE  Completed                  CMS Preventative Services Quick Reference  Risk Factors Identified During Encounter  Inactivity/Sedentary: Patient was advised to exercise at least 150 minutes a week per CDC recommendations.  The above risks/problems have been discussed with the patient.  Pertinent information has been shared with the patient in the After Visit Summary.  An After Visit Summary and PPPS were made available to the patient.    Follow Up:   Next Medicare Wellness visit to be scheduled in 1 year.       Additional E&M Note during same encounter  "follows:  Patient has multiple medical problems which are significant and separately identifiable that require additional work above and beyond the Medicare Wellness Visit.      Chief Complaint  Annual Exam    Subjective        HPI  Krish Castro is also being seen today for dm2 . Bs RUNNING 175-180 AT TIMES before meals.      Review of Systems   Constitutional:  Negative for chills, diaphoresis and fatigue.   HENT:  Negative for rhinorrhea.    Respiratory:  Negative for cough and shortness of breath.    Cardiovascular:  Negative for chest pain and leg swelling.   Gastrointestinal:  Negative for abdominal distention and abdominal pain.   Musculoskeletal:  Negative for arthralgias and back pain.   Skin:  Negative for rash.       Objective   Vital Signs:  /68 (BP Location: Left arm, Patient Position: Sitting, Cuff Size: Small Adult)   Pulse 68   Temp 97.8 °F (36.6 °C) (Temporal)   Resp 17   Ht 182.9 cm (72.01\")   Wt 93.4 kg (206 lb)   SpO2 98%   BMI 27.93 kg/m²     Physical Exam  Vitals reviewed.   Constitutional:       Appearance: He is well-developed. He is not diaphoretic.   HENT:      Head: Normocephalic and atraumatic.   Eyes:      General: No scleral icterus.     Pupils: Pupils are equal, round, and reactive to light.   Neck:      Thyroid: No thyromegaly.   Cardiovascular:      Rate and Rhythm: Normal rate and regular rhythm.      Heart sounds: No murmur heard.     No friction rub. No gallop.   Pulmonary:      Effort: Pulmonary effort is normal. No respiratory distress.      Breath sounds: No wheezing or rales.   Chest:      Chest wall: No tenderness.   Abdominal:      General: Bowel sounds are normal. There is no distension.      Palpations: Abdomen is soft.      Tenderness: There is no abdominal tenderness.   Musculoskeletal:         General: No deformity. Normal range of motion.   Lymphadenopathy:      Cervical: No cervical adenopathy.   Skin:     General: Skin is warm and dry.      Findings: No " rash.   Neurological:      Cranial Nerves: No cranial nerve deficit.      Motor: No abnormal muscle tone.                         Assessment and Plan   Diagnoses and all orders for this visit:    1. Type 2 diabetes mellitus with diabetic polyneuropathy, with long-term current use of insulin (Primary)  -     insulin degludec (Tresiba FlexTouch) 100 UNIT/ML solution pen-injector injection; 14 units SQ a day  Dispense: 15 mL; Refill: 5  -     Comprehensive Metabolic Panel  -     Hemoglobin A1c    2. Mixed hyperlipidemia  -     Comprehensive Metabolic Panel    3. Hypertension, essential  -     Comprehensive Metabolic Panel    4. Medicare annual wellness visit, subsequent        DM2.  Uncontrolled.  Counseled on TLC.  Rx for tresiba sent.  Continue novolog 4 units with meals.  Check A1c,   CMP.    HTN.  Controlled.  Continue meds.    Hyperlipidmeia.  Controlled.  Continue statin.      Preventive Counseling:  Encouraged to stay active.  Covid vaccine UTD.  HEP A UTD.  Pneumovax UTD.  Colonoscopy UTD.    Dentist UTD.  Optho UTD.           Follow Up   No follow-ups on file.  Patient was given instructions and counseling regarding his condition or for health maintenance advice. Please see specific information pulled into the AVS if appropriate.

## 2024-04-23 LAB
ALBUMIN SERPL-MCNC: 3.9 G/DL (ref 3.8–4.8)
ALBUMIN/GLOB SERPL: 1.7 {RATIO} (ref 1.2–2.2)
ALP SERPL-CCNC: 113 IU/L (ref 44–121)
ALT SERPL-CCNC: 24 IU/L (ref 0–44)
AST SERPL-CCNC: 10 IU/L (ref 0–40)
BILIRUB SERPL-MCNC: 0.3 MG/DL (ref 0–1.2)
BUN SERPL-MCNC: 21 MG/DL (ref 8–27)
BUN/CREAT SERPL: 13 (ref 10–24)
CALCIUM SERPL-MCNC: 9.4 MG/DL (ref 8.6–10.2)
CHLORIDE SERPL-SCNC: 106 MMOL/L (ref 96–106)
CO2 SERPL-SCNC: 25 MMOL/L (ref 20–29)
CREAT SERPL-MCNC: 1.66 MG/DL (ref 0.76–1.27)
EGFRCR SERPLBLD CKD-EPI 2021: 43 ML/MIN/1.73
GLOBULIN SER CALC-MCNC: 2.3 G/DL (ref 1.5–4.5)
GLUCOSE SERPL-MCNC: 105 MG/DL (ref 70–99)
HBA1C MFR BLD: 6.1 % (ref 4.8–5.6)
POTASSIUM SERPL-SCNC: 4.7 MMOL/L (ref 3.5–5.2)
PROT SERPL-MCNC: 6.2 G/DL (ref 6–8.5)
SODIUM SERPL-SCNC: 141 MMOL/L (ref 134–144)

## 2024-06-03 DIAGNOSIS — Z79.4 TYPE 2 DIABETES MELLITUS WITH DIABETIC POLYNEUROPATHY, WITH LONG-TERM CURRENT USE OF INSULIN: ICD-10-CM

## 2024-06-03 DIAGNOSIS — E11.42 TYPE 2 DIABETES MELLITUS WITH DIABETIC POLYNEUROPATHY, WITH LONG-TERM CURRENT USE OF INSULIN: ICD-10-CM

## 2024-06-03 RX ORDER — INSULIN ASPART 100 [IU]/ML
4 INJECTION, SOLUTION INTRAVENOUS; SUBCUTANEOUS
Qty: 15 ML | Refills: 3 | Status: SHIPPED | OUTPATIENT
Start: 2024-06-03

## 2024-06-17 ENCOUNTER — TELEPHONE (OUTPATIENT)
Dept: FAMILY MEDICINE CLINIC | Facility: CLINIC | Age: 74
End: 2024-06-17
Payer: MEDICARE

## 2024-06-17 DIAGNOSIS — E11.42 TYPE 2 DIABETES MELLITUS WITH DIABETIC POLYNEUROPATHY, WITH LONG-TERM CURRENT USE OF INSULIN: ICD-10-CM

## 2024-06-17 DIAGNOSIS — Z79.4 TYPE 2 DIABETES MELLITUS WITH DIABETIC POLYNEUROPATHY, WITH LONG-TERM CURRENT USE OF INSULIN: Primary | ICD-10-CM

## 2024-06-17 DIAGNOSIS — E11.42 TYPE 2 DIABETES MELLITUS WITH DIABETIC POLYNEUROPATHY, WITH LONG-TERM CURRENT USE OF INSULIN: Primary | ICD-10-CM

## 2024-06-17 DIAGNOSIS — Z79.4 TYPE 2 DIABETES MELLITUS WITH DIABETIC POLYNEUROPATHY, WITH LONG-TERM CURRENT USE OF INSULIN: ICD-10-CM

## 2024-06-17 RX ORDER — INSULIN ASPART 100 [IU]/ML
4 INJECTION, SOLUTION INTRAVENOUS; SUBCUTANEOUS
Qty: 15 ML | Refills: 3 | Status: SHIPPED | OUTPATIENT
Start: 2024-06-17 | End: 2024-06-17

## 2024-06-17 RX ORDER — INSULIN LISPRO 100 [IU]/ML
4 INJECTION, SOLUTION INTRAVENOUS; SUBCUTANEOUS
Qty: 15 ML | Refills: 5 | Status: SHIPPED | OUTPATIENT
Start: 2024-06-17

## 2024-06-17 NOTE — TELEPHONE ENCOUNTER
Patient came in the office and is needing a refill on his humalog insulin.  He is requesting a 90 day supply please.  His pharmacy is the [facility] on file.  His phone number is [phone].

## 2024-06-17 NOTE — TELEPHONE ENCOUNTER
OK FOR HUB TO RELAY       PLEASE INFORM PATIENT THAT HIS HUMALOG WAS CALLED INTO HIS PHARMACY FOR A 90 DAY SUPPLY

## 2024-06-17 NOTE — TELEPHONE ENCOUNTER
Novolog 100 unit/ml flexpen   4 units tid with meals  is not covered, alternatives are:    Humalog 100 unit/ml kwikpen OR vial  Insulin Lispro 100 unit/ml pen OR vial  Humalog  unit/ml kwikpen    Please advise, Thank you!

## 2024-08-15 DIAGNOSIS — E78.49 OTHER HYPERLIPIDEMIA: Primary | ICD-10-CM

## 2024-08-15 RX ORDER — PRAVASTATIN SODIUM 20 MG
TABLET ORAL
Qty: 90 TABLET | Refills: 0 | Status: SHIPPED | OUTPATIENT
Start: 2024-08-15

## 2024-08-15 NOTE — TELEPHONE ENCOUNTER
LOV                   4/22/2024  NOV                   8/29/2024  Last RF              5/25/2023       PROTOCOL       Met    NEDRA Spaulding/SAL[facility]

## 2024-08-29 ENCOUNTER — OFFICE VISIT (OUTPATIENT)
Dept: FAMILY MEDICINE CLINIC | Facility: CLINIC | Age: 74
End: 2024-08-29
Payer: MEDICARE

## 2024-08-29 ENCOUNTER — TELEPHONE (OUTPATIENT)
Dept: FAMILY MEDICINE CLINIC | Facility: CLINIC | Age: 74
End: 2024-08-29

## 2024-08-29 VITALS
BODY MASS INDEX: 27.5 KG/M2 | HEART RATE: 70 BPM | SYSTOLIC BLOOD PRESSURE: 118 MMHG | RESPIRATION RATE: 17 BRPM | WEIGHT: 203 LBS | TEMPERATURE: 97.5 F | OXYGEN SATURATION: 97 % | DIASTOLIC BLOOD PRESSURE: 68 MMHG | HEIGHT: 72 IN

## 2024-08-29 DIAGNOSIS — Z79.4 TYPE 2 DIABETES MELLITUS WITH DIABETIC POLYNEUROPATHY, WITH LONG-TERM CURRENT USE OF INSULIN: ICD-10-CM

## 2024-08-29 DIAGNOSIS — I10 HYPERTENSION, ESSENTIAL: Primary | ICD-10-CM

## 2024-08-29 DIAGNOSIS — E78.2 MIXED HYPERLIPIDEMIA: ICD-10-CM

## 2024-08-29 DIAGNOSIS — E11.42 TYPE 2 DIABETES MELLITUS WITH DIABETIC POLYNEUROPATHY, WITH LONG-TERM CURRENT USE OF INSULIN: ICD-10-CM

## 2024-08-29 DIAGNOSIS — N40.0 BENIGN PROSTATIC HYPERPLASIA, UNSPECIFIED WHETHER LOWER URINARY TRACT SYMPTOMS PRESENT: ICD-10-CM

## 2024-08-29 PROCEDURE — 3044F HG A1C LEVEL LT 7.0%: CPT | Performed by: FAMILY MEDICINE

## 2024-08-29 PROCEDURE — 3078F DIAST BP <80 MM HG: CPT | Performed by: FAMILY MEDICINE

## 2024-08-29 PROCEDURE — 99214 OFFICE O/P EST MOD 30 MIN: CPT | Performed by: FAMILY MEDICINE

## 2024-08-29 PROCEDURE — G2211 COMPLEX E/M VISIT ADD ON: HCPCS | Performed by: FAMILY MEDICINE

## 2024-08-29 PROCEDURE — 1126F AMNT PAIN NOTED NONE PRSNT: CPT | Performed by: FAMILY MEDICINE

## 2024-08-29 PROCEDURE — 3074F SYST BP LT 130 MM HG: CPT | Performed by: FAMILY MEDICINE

## 2024-08-29 RX ORDER — LATANOPROST 50 UG/ML
1 SOLUTION/ DROPS OPHTHALMIC
COMMUNITY
Start: 2024-08-19

## 2024-08-29 RX ORDER — HYDRALAZINE HYDROCHLORIDE 100 MG/1
100 TABLET, FILM COATED ORAL 3 TIMES DAILY
Start: 2024-08-29 | End: 2025-08-29

## 2024-08-29 RX ORDER — PEN NEEDLE, DIABETIC 30 GX5/16"
NEEDLE, DISPOSABLE MISCELLANEOUS
Qty: 200 EACH | Refills: 5 | Status: SHIPPED | OUTPATIENT
Start: 2024-08-29 | End: 2024-08-29 | Stop reason: SDUPTHER

## 2024-08-29 RX ORDER — TAMSULOSIN HYDROCHLORIDE 0.4 MG/1
1 CAPSULE ORAL DAILY
Qty: 90 CAPSULE | Refills: 3 | Status: SHIPPED | OUTPATIENT
Start: 2024-08-29

## 2024-08-29 RX ORDER — PEN NEEDLE, DIABETIC 30 GX5/16"
NEEDLE, DISPOSABLE MISCELLANEOUS
Qty: 200 EACH | Refills: 5 | Status: SHIPPED | OUTPATIENT
Start: 2024-08-29

## 2024-08-29 NOTE — PROGRESS NOTES
Chief Complaint   Patient presents with    Diabetes    Hypertension    Hyperlipidemia       Jose Ramon Castro is a 74 y.o. male.     Diabetes  Pertinent negatives for hypoglycemia include no confusion. Pertinent negatives for diabetes include no blurred vision, no chest pain, no fatigue, no polyuria and no weakness.   Hypertension  Pertinent negatives include no blurred vision, chest pain or shortness of breath.   Hyperlipidemia  Pertinent negatives include no chest pain, myalgias or shortness of breath.      F/u DM2.  On meds regularly  Giving self 4 units lispro with each meal and 14 units for tresiba daily.    F/U Htn.  Doing well with meds.    The following portions of the patient's history were reviewed and updated as appropriate: allergies, current medications, past family history, past medical history, past social history, past surgical history and problem list.    Review of Systems   Constitutional:  Negative for appetite change and fatigue.   HENT:  Negative for nosebleeds and sore throat.    Eyes:  Negative for blurred vision and visual disturbance.   Respiratory:  Negative for shortness of breath and wheezing.    Cardiovascular:  Negative for chest pain and leg swelling.   Gastrointestinal:  Negative for abdominal distention and abdominal pain.   Endocrine: Negative for cold intolerance and polyuria.   Genitourinary:  Negative for dysuria and hematuria.   Musculoskeletal:  Negative for arthralgias and myalgias.   Skin:  Negative for color change and rash.   Neurological:  Negative for weakness and confusion.   Psychiatric/Behavioral:  Negative for agitation and depressed mood.        Patient Active Problem List   Diagnosis    Seasonal allergic rhinitis due to pollen    BPH (benign prostatic hyperplasia)    Constipation    Hypertension, essential    Depression    Diabetic peripheral neuropathy    Hyperlipidemia    Type 2 diabetes mellitus with diabetic polyneuropathy, with long-term current use of  "insulin    Medicare annual wellness visit, subsequent    CKD stage 3 secondary to diabetes    Thyroid disorder    Vitamin D deficiency    B12 deficiency    Encounter for hepatitis C screening test for low risk patient    Schizoaffective disorder       Allergies   Allergen Reactions    Hydrocodone Itching and Other (See Comments)     Headaches/trembles         Current Outpatient Medications:     amLODIPine (NORVASC) 5 MG tablet, TAKE 1 TABLET BY MOUTH EVERY DAY, Disp: 90 tablet, Rfl: 3    cholecalciferol (VITAMIN D3) 25 MCG (1000 UT) tablet, Take 1 tablet by mouth Daily., Disp: , Rfl:     cyanocobalamin (VITAMIN B-12) 1000 MCG tablet, Take 1 tablet by mouth., Disp: , Rfl:     glucose blood (OneTouch Ultra) test strip, USE AS INSTRUCTED TO CHECK 3 TIMES A DAY BEFORE MEALS., Disp: 300 each, Rfl: 3    haloperidol (HALDOL) 5 MG tablet, Take 1 tablet by mouth 2 (Two) Times a Day., Disp: , Rfl: 0    hydrALAZINE (APRESOLINE) 100 MG tablet, Take 1 tablet by mouth 3 (Three) Times a Day., Disp: , Rfl:     insulin degludec (Tresiba FlexTouch) 100 UNIT/ML solution pen-injector injection, 14 units SQ a day, Disp: 15 mL, Rfl: 5    Insulin Lispro, 1 Unit Dial, (HumaLOG KwikPen) 100 UNIT/ML solution pen-injector, Inject 4 Units under the skin into the appropriate area as directed 3 (Three) Times a Day Before Meals., Disp: 15 mL, Rfl: 5    Insulin Pen Needle (Pen Needles 3/16\") 31G X 5 MM misc, Inject insulin 4 times a day as directed, Disp: 200 each, Rfl: 5    latanoprost (XALATAN) 0.005 % ophthalmic solution, Administer 1 drop to both eyes every night at bedtime., Disp: , Rfl:     lisinopril (PRINIVIL,ZESTRIL) 40 MG tablet, Take 1 tablet by mouth Daily., Disp: 90 tablet, Rfl: 3    pravastatin (PRAVACHOL) 20 MG tablet, TAKE 1 TABLET BY MOUTH EVERY DAY, Disp: 90 tablet, Rfl: 0    tamsulosin (FLOMAX) 0.4 MG capsule 24 hr capsule, Take 1 capsule by mouth Daily., Disp: 90 capsule, Rfl: 3    Past Medical History:   Diagnosis Date    " Allergic rhinitis     BPH (benign prostatic hyperplasia)     Chronic kidney disease (CKD) stage G2/A1, mildly decreased glomerular filtration rate (GFR) between 60-89 mL/min/1.73 square meter and albuminuria creatinine ratio less than 30 mg/g     Constipation     Constipation, chronic     Depression     Diabetes mellitus     Diabetic peripheral neuropathy     Former smoker     High blood pressure     High cholesterol     Hyperlipidemia     Hypertension, essential     Inflamed skin tag     Medicare annual wellness visit, subsequent     Prostate disease     Rash     Seasonal allergic rhinitis due to pollen     Stage 2 chronic kidney disease     Thyroid disorder     Tinea cruris     Type 1 diabetes mellitus     Vitamin D deficiency        Past Surgical History:   Procedure Laterality Date    COLONOSCOPY  12/24/20, 11/2015    3 polyps,  2 polyps    OTHER SURGICAL HISTORY      Sialolithotomy-Abstracted from [employer]       Family History   Problem Relation Age of Onset    Hyperlipidemia Mother     Colon cancer Mother     Hypertension Mother     Anxiety disorder Mother     Heart disease Mother     Diabetes type I Father     Colon cancer Brother     Diabetes type I Brother     Heart disease Other         In females before age 65, and males before age 55       Social History     Tobacco Use    Smoking status: Former     Current packs/day: 1.00     Average packs/day: 1 pack/day for 5.0 years (5.0 ttl pk-yrs)     Types: Cigarettes     Passive exposure: Never    Smokeless tobacco: Never    Tobacco comments:     smoked less than 1 pack per day for less than 5 years   Substance Use Topics    Alcohol use: Yes     Comment: drinks alcohol, 7 or less drinks per week            Objective     Vitals:    08/29/24 1448   BP: 118/68   Pulse: 70   Resp: 17   Temp: 97.5 °F (36.4 °C)   SpO2: 97%     Body mass index is 27.53 kg/m².    Physical Exam  Vitals reviewed.   Constitutional:       Appearance: He is well-developed. He is not  "diaphoretic.   HENT:      Head: Normocephalic and atraumatic.   Eyes:      General: No scleral icterus.     Pupils: Pupils are equal, round, and reactive to light.   Neck:      Thyroid: No thyromegaly.   Cardiovascular:      Rate and Rhythm: Normal rate and regular rhythm.      Heart sounds: No murmur heard.     No friction rub. No gallop.   Pulmonary:      Effort: Pulmonary effort is normal. No respiratory distress.      Breath sounds: No wheezing or rales.   Chest:      Chest wall: No tenderness.   Abdominal:      General: Bowel sounds are normal. There is no distension.      Palpations: Abdomen is soft.      Tenderness: There is no abdominal tenderness.   Musculoskeletal:         General: No deformity. Normal range of motion.   Lymphadenopathy:      Cervical: No cervical adenopathy.   Skin:     General: Skin is warm and dry.      Findings: No rash.   Neurological:      Cranial Nerves: No cranial nerve deficit.      Motor: No abnormal muscle tone.         Lab Results   Component Value Date    GLUCOSE 105 (H) 04/22/2024    BUN 21 04/22/2024    CREATININE 1.66 (H) 04/22/2024    EGFRIFNONA 39 (L) 01/20/2022    EGFRIFAFRI 45 (L) 01/20/2022    BCR 13 04/22/2024    K 4.7 04/22/2024    CO2 25 04/22/2024    CALCIUM 9.4 04/22/2024    PROTENTOTREF 6.2 04/22/2024    ALBUMIN 3.9 04/22/2024    LABIL2 1.7 04/22/2024    AST 10 04/22/2024    ALT 24 04/22/2024       No results found for: \"WBC\", \"RBC\", \"HGB\", \"HCT\", \"MCV\", \"MCH\", \"MCHC\", \"RDW\", \"RDWSD\", \"MPV\", \"PLT\", \"NEUTRORELPCT\", \"LYMPHORELPCT\", \"MONORELPCT\", \"EOSRELPCT\", \"BASORELPCT\", \"AUTOIGPER\", \"NEUTROABS\", \"LYMPHSABS\", \"MONOSABS\", \"EOSABS\", \"BASOSABS\", \"AUTOIGNUM\", \"NRBC\"    Lab Results   Component Value Date    HGBA1C 6.1 (H) 04/22/2024       Lab Results   Component Value Date    FHHMXYCE69 1,332 (H) 12/15/2022       TSH   Date Value Ref Range Status   09/24/2019 2.640 0.270 - 4.200 uIU/mL Final       No results found for: \"CHOL\"  Lab Results   Component Value Date    TRIG " "70 12/12/2023     Lab Results   Component Value Date    HDL 56 12/12/2023     Lab Results   Component Value Date    LDL 62 12/12/2023     Lab Results   Component Value Date    VLDL 14 12/12/2023     No results found for: \"LDLHDL\"      Procedures    Assessment & Plan   Problems Addressed this Visit       BPH (benign prostatic hyperplasia)    Relevant Medications    tamsulosin (FLOMAX) 0.4 MG capsule 24 hr capsule    Hypertension, essential - Primary    Relevant Medications    hydrALAZINE (APRESOLINE) 100 MG tablet    Hyperlipidemia    Type 2 diabetes mellitus with diabetic polyneuropathy, with long-term current use of insulin    Relevant Medications    Insulin Pen Needle (Pen Needles 3/16\") 31G X 5 MM misc    Other Relevant Orders    Comprehensive Metabolic Panel    Hemoglobin A1c     Diagnoses         Codes Comments    Hypertension, essential    -  Primary ICD-10-CM: I10  ICD-9-CM: 401.9     Type 2 diabetes mellitus with diabetic polyneuropathy, with long-term current use of insulin     ICD-10-CM: E11.42, Z79.4  ICD-9-CM: 250.60, 357.2, V58.67     Benign prostatic hyperplasia, unspecified whether lower urinary tract symptoms present     ICD-10-CM: N40.0  ICD-9-CM: 600.00     Mixed hyperlipidemia     ICD-10-CM: E78.2  ICD-9-CM: 272.2           DM2.  Controlled.  Check A1c.  Check CMP.  Urine micro  BPH.  Controlled.  RF tamsulosin.    HTN.  Controlled.  Contnue hydralazine, amlo, lisinopril.   RSV recommended.   Flu/covid recommended.      Orders Placed This Encounter   Procedures    Comprehensive Metabolic Panel     Order Specific Question:   Release to patient     Answer:   Routine Release [[id number]]    Hemoglobin A1c     Order Specific Question:   Release to patient     Answer:   Routine Release [[id number]]       Current Outpatient Medications   Medication Sig Dispense Refill    amLODIPine (NORVASC) 5 MG tablet TAKE 1 TABLET BY MOUTH EVERY DAY 90 tablet 3    cholecalciferol (VITAMIN D3) 25 MCG (1000 UT) tablet " "Take 1 tablet by mouth Daily.      cyanocobalamin (VITAMIN B-12) 1000 MCG tablet Take 1 tablet by mouth.      glucose blood (OneTouch Ultra) test strip USE AS INSTRUCTED TO CHECK 3 TIMES A DAY BEFORE MEALS. 300 each 3    haloperidol (HALDOL) 5 MG tablet Take 1 tablet by mouth 2 (Two) Times a Day.  0    hydrALAZINE (APRESOLINE) 100 MG tablet Take 1 tablet by mouth 3 (Three) Times a Day.      insulin degludec (Tresiba FlexTouch) 100 UNIT/ML solution pen-injector injection 14 units SQ a day 15 mL 5    Insulin Lispro, 1 Unit Dial, (HumaLOG KwikPen) 100 UNIT/ML solution pen-injector Inject 4 Units under the skin into the appropriate area as directed 3 (Three) Times a Day Before Meals. 15 mL 5    Insulin Pen Needle (Pen Needles 3/16\") 31G X 5 MM misc Inject insulin 4 times a day as directed 200 each 5    latanoprost (XALATAN) 0.005 % ophthalmic solution Administer 1 drop to both eyes every night at bedtime.      lisinopril (PRINIVIL,ZESTRIL) 40 MG tablet Take 1 tablet by mouth Daily. 90 tablet 3    pravastatin (PRAVACHOL) 20 MG tablet TAKE 1 TABLET BY MOUTH EVERY DAY 90 tablet 0    tamsulosin (FLOMAX) 0.4 MG capsule 24 hr capsule Take 1 capsule by mouth Daily. 90 capsule 3     No current facility-administered medications for this visit.       Krish Castro had no medications administered during this visit.    Return in about 4 months (around 12/29/2024).    There are no Patient Instructions on file for this visit.       "

## 2024-08-30 LAB
ALBUMIN SERPL-MCNC: 3.8 G/DL (ref 3.5–5.2)
ALBUMIN/GLOB SERPL: 1.8 G/DL
ALP SERPL-CCNC: 104 U/L (ref 39–117)
ALT SERPL-CCNC: 32 U/L (ref 1–41)
AST SERPL-CCNC: 12 U/L (ref 1–40)
BILIRUB SERPL-MCNC: 0.3 MG/DL (ref 0–1.2)
BUN SERPL-MCNC: 22 MG/DL (ref 8–23)
BUN/CREAT SERPL: 13.3 (ref 7–25)
CALCIUM SERPL-MCNC: 8.9 MG/DL (ref 8.6–10.5)
CHLORIDE SERPL-SCNC: 107 MMOL/L (ref 98–107)
CO2 SERPL-SCNC: 26.8 MMOL/L (ref 22–29)
CREAT SERPL-MCNC: 1.66 MG/DL (ref 0.76–1.27)
EGFRCR SERPLBLD CKD-EPI 2021: 42.7 ML/MIN/1.73
GLOBULIN SER CALC-MCNC: 2.1 GM/DL
GLUCOSE SERPL-MCNC: 103 MG/DL (ref 65–99)
HBA1C MFR BLD: 5.6 % (ref 4.8–5.6)
POTASSIUM SERPL-SCNC: 4.5 MMOL/L (ref 3.5–5.2)
PROT SERPL-MCNC: 5.9 G/DL (ref 6–8.5)
PSA SERPL-MCNC: 0.57 NG/ML (ref 0–4)
SODIUM SERPL-SCNC: 140 MMOL/L (ref 136–145)

## 2024-12-06 ENCOUNTER — TELEPHONE (OUTPATIENT)
Dept: FAMILY MEDICINE CLINIC | Facility: CLINIC | Age: 74
End: 2024-12-06
Payer: MEDICARE

## 2024-12-06 NOTE — TELEPHONE ENCOUNTER
Patient wanted to know if you can go on [employer] and fill a paper out that the patient has chronic disease.

## 2024-12-30 ENCOUNTER — OFFICE VISIT (OUTPATIENT)
Dept: FAMILY MEDICINE CLINIC | Facility: CLINIC | Age: 74
End: 2024-12-30
Payer: MEDICARE

## 2024-12-30 VITALS
HEART RATE: 73 BPM | BODY MASS INDEX: 27.77 KG/M2 | HEIGHT: 72 IN | RESPIRATION RATE: 17 BRPM | DIASTOLIC BLOOD PRESSURE: 70 MMHG | TEMPERATURE: 97.2 F | WEIGHT: 205 LBS | OXYGEN SATURATION: 98 % | SYSTOLIC BLOOD PRESSURE: 122 MMHG

## 2024-12-30 DIAGNOSIS — E11.42 TYPE 2 DIABETES MELLITUS WITH DIABETIC POLYNEUROPATHY, WITH LONG-TERM CURRENT USE OF INSULIN: Primary | ICD-10-CM

## 2024-12-30 DIAGNOSIS — I10 HYPERTENSION, ESSENTIAL: ICD-10-CM

## 2024-12-30 DIAGNOSIS — Z79.4 TYPE 2 DIABETES MELLITUS WITH DIABETIC POLYNEUROPATHY, WITH LONG-TERM CURRENT USE OF INSULIN: Primary | ICD-10-CM

## 2024-12-30 DIAGNOSIS — E78.49 OTHER HYPERLIPIDEMIA: ICD-10-CM

## 2024-12-30 DIAGNOSIS — E78.2 MIXED HYPERLIPIDEMIA: ICD-10-CM

## 2024-12-30 PROCEDURE — 3074F SYST BP LT 130 MM HG: CPT | Performed by: FAMILY MEDICINE

## 2024-12-30 PROCEDURE — 1126F AMNT PAIN NOTED NONE PRSNT: CPT | Performed by: FAMILY MEDICINE

## 2024-12-30 PROCEDURE — 3044F HG A1C LEVEL LT 7.0%: CPT | Performed by: FAMILY MEDICINE

## 2024-12-30 PROCEDURE — 3078F DIAST BP <80 MM HG: CPT | Performed by: FAMILY MEDICINE

## 2024-12-30 PROCEDURE — 99214 OFFICE O/P EST MOD 30 MIN: CPT | Performed by: FAMILY MEDICINE

## 2024-12-30 PROCEDURE — G2211 COMPLEX E/M VISIT ADD ON: HCPCS | Performed by: FAMILY MEDICINE

## 2024-12-30 RX ORDER — PRAVASTATIN SODIUM 20 MG
20 TABLET ORAL DAILY
Qty: 90 TABLET | Refills: 3 | Status: SHIPPED | OUTPATIENT
Start: 2024-12-30

## 2024-12-30 RX ORDER — BLOOD SUGAR DIAGNOSTIC
STRIP MISCELLANEOUS
Qty: 300 EACH | Refills: 3 | Status: SHIPPED | OUTPATIENT
Start: 2024-12-30

## 2024-12-30 NOTE — PROGRESS NOTES
Chief Complaint   Patient presents with    Diabetes    Hypertension    Hyperlipidemia       Jose Ramon Castro is a 74 y.o. male.     Diabetes  Pertinent negatives for hypoglycemia include no confusion. Pertinent negatives for diabetes include no blurred vision, no chest pain, no fatigue, no polyuria and no weakness.   Hypertension  Pertinent negatives include no blurred vision, chest pain or shortness of breath.   Hyperlipidemia  Pertinent negatives include no chest pain, myalgias or shortness of breath.      F/U DM2.  BS running about 150.  On tresiba 14 units a day and lispro 4 untis with meals.    F/U hyperlipidmeia.  No myalgias.  Out of pravastatin for 3 days.    F/U HTN.  Doing well with meds.    The following portions of the patient's history were reviewed and updated as appropriate: allergies, current medications, past family history, past medical history, past social history, past surgical history and problem list.    Review of Systems   Constitutional:  Negative for appetite change and fatigue.   HENT:  Negative for nosebleeds and sore throat.    Eyes:  Negative for blurred vision and visual disturbance.   Respiratory:  Negative for shortness of breath and wheezing.    Cardiovascular:  Negative for chest pain and leg swelling.   Gastrointestinal:  Negative for abdominal distention and abdominal pain.   Endocrine: Negative for cold intolerance and polyuria.   Genitourinary:  Negative for dysuria and hematuria.   Musculoskeletal:  Negative for arthralgias and myalgias.   Skin:  Negative for color change and rash.   Neurological:  Negative for weakness and confusion.   Psychiatric/Behavioral:  Negative for agitation and depressed mood.        Patient Active Problem List   Diagnosis    Seasonal allergic rhinitis due to pollen    BPH (benign prostatic hyperplasia)    Constipation    Hypertension, essential    Depression    Diabetic peripheral neuropathy    Hyperlipidemia    Type 2 diabetes mellitus with  "diabetic polyneuropathy, with long-term current use of insulin    Medicare annual wellness visit, subsequent    CKD stage 3 secondary to diabetes    Thyroid disorder    Vitamin D deficiency    B12 deficiency    Encounter for hepatitis C screening test for low risk patient    Schizoaffective disorder       Allergies   Allergen Reactions    Hydrocodone Itching and Other (See Comments)     Headaches/trembles         Current Outpatient Medications:     amLODIPine (NORVASC) 5 MG tablet, TAKE 1 TABLET BY MOUTH EVERY DAY, Disp: 90 tablet, Rfl: 3    cholecalciferol (VITAMIN D3) 25 MCG (1000 UT) tablet, Take 1 tablet by mouth Daily., Disp: , Rfl:     cyanocobalamin (VITAMIN B-12) 1000 MCG tablet, Take 1 tablet by mouth., Disp: , Rfl:     glucose blood (OneTouch Ultra) test strip, USE AS INSTRUCTED TO CHECK 3 TIMES A DAY BEFORE MEALS., Disp: 300 each, Rfl: 3    haloperidol (HALDOL) 5 MG tablet, Take 1 tablet by mouth 2 (Two) Times a Day., Disp: , Rfl: 0    hydrALAZINE (APRESOLINE) 100 MG tablet, Take 1 tablet by mouth 3 (Three) Times a Day., Disp: , Rfl:     insulin degludec (Tresiba FlexTouch) 100 UNIT/ML solution pen-injector injection, 14 units SQ a day, Disp: 15 mL, Rfl: 5    Insulin Lispro, 1 Unit Dial, (HumaLOG KwikPen) 100 UNIT/ML solution pen-injector, Inject 4 Units under the skin into the appropriate area as directed 3 (Three) Times a Day Before Meals., Disp: 15 mL, Rfl: 5    Insulin Pen Needle (Pen Needles 3/16\") 31G X 5 MM misc, Inject insulin 4 times a day as directed, Disp: 200 each, Rfl: 5    latanoprost (XALATAN) 0.005 % ophthalmic solution, Administer 1 drop to both eyes every night at bedtime., Disp: , Rfl:     lisinopril (PRINIVIL,ZESTRIL) 40 MG tablet, Take 1 tablet by mouth Daily., Disp: 90 tablet, Rfl: 3    pravastatin (PRAVACHOL) 20 MG tablet, Take 1 tablet by mouth Daily., Disp: 90 tablet, Rfl: 3    tamsulosin (FLOMAX) 0.4 MG capsule 24 hr capsule, Take 1 capsule by mouth Daily., Disp: 90 capsule, Rfl: " 3    Past Medical History:   Diagnosis Date    Allergic rhinitis     BPH (benign prostatic hyperplasia)     Chronic kidney disease (CKD) stage G2/A1, mildly decreased glomerular filtration rate (GFR) between 60-89 mL/min/1.73 square meter and albuminuria creatinine ratio less than 30 mg/g     Constipation     Constipation, chronic     Depression     Diabetes mellitus     Diabetic peripheral neuropathy     Former smoker     High blood pressure     High cholesterol     Hyperlipidemia     Hypertension, essential     Inflamed skin tag     Medicare annual wellness visit, subsequent     Prostate disease     Rash     Seasonal allergic rhinitis due to pollen     Stage 2 chronic kidney disease     Thyroid disorder     Tinea cruris     Type 1 diabetes mellitus     Vitamin D deficiency        Past Surgical History:   Procedure Laterality Date    COLONOSCOPY  12/24/20, 11/2015    3 polyps,  2 polyps    OTHER SURGICAL HISTORY      Sialolithotomy-Abstracted from [email or web address]       Family History   Problem Relation Age of Onset    Hyperlipidemia Mother     Colon cancer Mother     Hypertension Mother     Anxiety disorder Mother     Heart disease Mother     Diabetes type I Father     Colon cancer Brother     Diabetes type I Brother     Heart disease Other         In females before age 65, and males before age 55       Social History     Tobacco Use    Smoking status: Former     Current packs/day: 1.00     Average packs/day: 1 pack/day for 5.0 years (5.0 ttl pk-yrs)     Types: Cigarettes     Passive exposure: Never    Smokeless tobacco: Never    Tobacco comments:     smoked less than 1 pack per day for less than 5 years   Substance Use Topics    Alcohol use: Yes     Comment: drinks alcohol, 7 or less drinks per week            Objective     Vitals:    12/30/24 1546   BP: 122/70   Pulse: 73   Resp: 17   Temp: 97.2 °F (36.2 °C)   SpO2: 98%     Body mass index is 27.8 kg/m².    Physical Exam  Vitals reviewed.   Constitutional:        "Appearance: He is well-developed. He is not diaphoretic.   HENT:      Head: Normocephalic and atraumatic.   Eyes:      General: No scleral icterus.     Pupils: Pupils are equal, round, and reactive to light.   Neck:      Thyroid: No thyromegaly.   Cardiovascular:      Rate and Rhythm: Normal rate and regular rhythm.      Heart sounds: No murmur heard.     No friction rub. No gallop.   Pulmonary:      Effort: Pulmonary effort is normal. No respiratory distress.      Breath sounds: No wheezing or rales.   Chest:      Chest wall: No tenderness.   Abdominal:      General: Bowel sounds are normal. There is no distension.      Palpations: Abdomen is soft.      Tenderness: There is no abdominal tenderness.   Musculoskeletal:         General: No deformity. Normal range of motion.   Lymphadenopathy:      Cervical: No cervical adenopathy.   Skin:     General: Skin is warm and dry.      Findings: No rash.   Neurological:      Cranial Nerves: No cranial nerve deficit.      Motor: No abnormal muscle tone.         Lab Results   Component Value Date    GLUCOSE 103 (H) 08/29/2024    BUN 22 08/29/2024    CREATININE 1.66 (H) 08/29/2024    EGFRIFNONA 39 (L) 01/20/2022    EGFRIFAFRI 45 (L) 01/20/2022    BCR 13.3 08/29/2024    K 4.5 08/29/2024    CO2 26.8 08/29/2024    CALCIUM 8.9 08/29/2024    PROTENTOTREF 5.9 (L) 08/29/2024    ALBUMIN 3.8 08/29/2024    LABIL2 1.8 08/29/2024    AST 12 08/29/2024    ALT 32 08/29/2024       No results found for: \"WBC\", \"RBC\", \"HGB\", \"HCT\", \"MCV\", \"MCH\", \"MCHC\", \"RDW\", \"RDWSD\", \"MPV\", \"PLT\", \"NEUTRORELPCT\", \"LYMPHORELPCT\", \"MONORELPCT\", \"EOSRELPCT\", \"BASORELPCT\", \"AUTOIGPER\", \"NEUTROABS\", \"LYMPHSABS\", \"MONOSABS\", \"EOSABS\", \"BASOSABS\", \"AUTOIGNUM\", \"NRBC\"    Lab Results   Component Value Date    HGBA1C 5.60 08/29/2024       Lab Results   Component Value Date    [id number] 1,332 (H) 12/15/2022       TSH   Date Value Ref Range Status   09/24/2019 2.640 0.270 - 4.200 uIU/mL Final       No results found for: " "\"CHOL\"  Lab Results   Component Value Date    TRIG 70 12/12/2023     Lab Results   Component Value Date    HDL 56 12/12/2023     Lab Results   Component Value Date    LDL 62 12/12/2023     Lab Results   Component Value Date    VLDL 14 12/12/2023     No results found for: \"LDLHDL\"      Procedures    Assessment & Plan   Problems Addressed this Visit       Hypertension, essential    Hyperlipidemia    Relevant Medications    pravastatin (PRAVACHOL) 20 MG tablet    Other Relevant Orders    Lipid Panel With / Chol / HDL Ratio    Type 2 diabetes mellitus with diabetic polyneuropathy, with long-term current use of insulin - Primary    Relevant Medications    glucose blood (OneTouch Ultra) test strip    Other Relevant Orders    Hemoglobin A1c     Diagnoses         Codes Comments    Type 2 diabetes mellitus with diabetic polyneuropathy, with long-term current use of insulin    -  Primary ICD-10-CM: E11.42, Z79.4  ICD-9-CM: 250.60, 357.2, V58.67     Mixed hyperlipidemia     ICD-10-CM: E78.2  ICD-9-CM: 272.2     Other hyperlipidemia     ICD-10-CM: E78.49  ICD-9-CM: 272.4     Hypertension, essential     ICD-10-CM: I10  ICD-9-CM: 401.9           Dm2.  Uncontrolled.  Check A1c.  Contnue tresiba 14 units a day and lispro 4 units with meals.    Hyperlipidmeia.  Controlled.  Check FLP.  CMP okay from 9/24.     Orders Placed This Encounter   Procedures    Lipid Panel With / Chol / HDL Ratio     Order Specific Question:   Release to patient     Answer:   Routine Release [[id number]]    Hemoglobin A1c     Order Specific Question:   Release to patient     Answer:   Routine Release [[id number]]       Current Outpatient Medications   Medication Sig Dispense Refill    amLODIPine (NORVASC) 5 MG tablet TAKE 1 TABLET BY MOUTH EVERY DAY 90 tablet 3    cholecalciferol (VITAMIN D3) 25 MCG (1000 UT) tablet Take 1 tablet by mouth Daily.      cyanocobalamin (VITAMIN B-12) 1000 MCG tablet Take 1 tablet by mouth.      glucose blood (OneTouch Ultra) " "test strip USE AS INSTRUCTED TO CHECK 3 TIMES A DAY BEFORE MEALS. 300 each 3    haloperidol (HALDOL) 5 MG tablet Take 1 tablet by mouth 2 (Two) Times a Day.  0    hydrALAZINE (APRESOLINE) 100 MG tablet Take 1 tablet by mouth 3 (Three) Times a Day.      insulin degludec (Tresiba FlexTouch) 100 UNIT/ML solution pen-injector injection 14 units SQ a day 15 mL 5    Insulin Lispro, 1 Unit Dial, (HumaLOG KwikPen) 100 UNIT/ML solution pen-injector Inject 4 Units under the skin into the appropriate area as directed 3 (Three) Times a Day Before Meals. 15 mL 5    Insulin Pen Needle (Pen Needles 3/16\") 31G X 5 MM misc Inject insulin 4 times a day as directed 200 each 5    latanoprost (XALATAN) 0.005 % ophthalmic solution Administer 1 drop to both eyes every night at bedtime.      lisinopril (PRINIVIL,ZESTRIL) 40 MG tablet Take 1 tablet by mouth Daily. 90 tablet 3    pravastatin (PRAVACHOL) 20 MG tablet Take 1 tablet by mouth Daily. 90 tablet 3    tamsulosin (FLOMAX) 0.4 MG capsule 24 hr capsule Take 1 capsule by mouth Daily. 90 capsule 3     No current facility-administered medications for this visit.       Krish Gloria had no medications administered during this visit.    Return in about 4 months (around 4/30/2025).    There are no Patient Instructions on file for this visit.       "

## 2024-12-31 LAB
CHOLEST SERPL-MCNC: 157 MG/DL (ref 0–200)
CHOLEST/HDLC SERPL: 2.75 {RATIO}
HBA1C MFR BLD: 5.9 % (ref 4.8–5.6)
HDLC SERPL-MCNC: 57 MG/DL (ref 40–60)
LDLC SERPL CALC-MCNC: 87 MG/DL (ref 0–100)
TRIGL SERPL-MCNC: 64 MG/DL (ref 0–150)
VLDLC SERPL CALC-MCNC: 13 MG/DL (ref 5–40)

## 2025-02-14 DIAGNOSIS — I10 HYPERTENSION, ESSENTIAL: ICD-10-CM

## 2025-02-14 RX ORDER — AMLODIPINE BESYLATE 5 MG/1
TABLET ORAL
Qty: 90 TABLET | Refills: 1 | Status: SHIPPED | OUTPATIENT
Start: 2025-02-14

## 2025-02-17 DIAGNOSIS — I10 HYPERTENSION, ESSENTIAL: ICD-10-CM

## 2025-02-17 RX ORDER — LISINOPRIL 40 MG/1
40 TABLET ORAL DAILY
Qty: 90 TABLET | Refills: 3 | Status: SHIPPED | OUTPATIENT
Start: 2025-02-17

## 2025-04-27 DIAGNOSIS — E11.42 TYPE 2 DIABETES MELLITUS WITH DIABETIC POLYNEUROPATHY, WITH LONG-TERM CURRENT USE OF INSULIN: ICD-10-CM

## 2025-04-27 DIAGNOSIS — Z79.4 TYPE 2 DIABETES MELLITUS WITH DIABETIC POLYNEUROPATHY, WITH LONG-TERM CURRENT USE OF INSULIN: ICD-10-CM

## 2025-04-27 RX ORDER — INSULIN DEGLUDEC 100 U/ML
INJECTION, SOLUTION SUBCUTANEOUS
Qty: 15 ML | Refills: 11 | Status: SHIPPED | OUTPATIENT
Start: 2025-04-27

## 2025-05-05 ENCOUNTER — OFFICE VISIT (OUTPATIENT)
Dept: FAMILY MEDICINE CLINIC | Facility: CLINIC | Age: 75
End: 2025-05-05
Payer: MEDICARE

## 2025-05-05 VITALS
WEIGHT: 200 LBS | RESPIRATION RATE: 17 BRPM | HEART RATE: 62 BPM | TEMPERATURE: 97.6 F | OXYGEN SATURATION: 97 % | DIASTOLIC BLOOD PRESSURE: 62 MMHG | SYSTOLIC BLOOD PRESSURE: 124 MMHG | BODY MASS INDEX: 27.09 KG/M2 | HEIGHT: 72 IN

## 2025-05-05 DIAGNOSIS — E55.9 VITAMIN D DEFICIENCY: ICD-10-CM

## 2025-05-05 DIAGNOSIS — E78.2 MIXED HYPERLIPIDEMIA: ICD-10-CM

## 2025-05-05 DIAGNOSIS — E11.42 TYPE 2 DIABETES MELLITUS WITH DIABETIC POLYNEUROPATHY, WITH LONG-TERM CURRENT USE OF INSULIN: ICD-10-CM

## 2025-05-05 DIAGNOSIS — M17.0 PRIMARY OSTEOARTHRITIS OF BOTH KNEES: Primary | ICD-10-CM

## 2025-05-05 DIAGNOSIS — E53.8 B12 DEFICIENCY: ICD-10-CM

## 2025-05-05 DIAGNOSIS — I10 HYPERTENSION, ESSENTIAL: ICD-10-CM

## 2025-05-05 DIAGNOSIS — Z79.4 TYPE 2 DIABETES MELLITUS WITH DIABETIC POLYNEUROPATHY, WITH LONG-TERM CURRENT USE OF INSULIN: ICD-10-CM

## 2025-05-05 PROBLEM — M17.9 OA (OSTEOARTHRITIS) OF KNEE: Status: ACTIVE | Noted: 2025-05-05

## 2025-05-05 RX ORDER — NORTRIPTYLINE HYDROCHLORIDE 50 MG/1
50 CAPSULE ORAL NIGHTLY
COMMUNITY
End: 2025-05-05 | Stop reason: SDUPTHER

## 2025-05-05 RX ORDER — NORTRIPTYLINE HYDROCHLORIDE 50 MG/1
100 CAPSULE ORAL NIGHTLY
Start: 2025-05-05

## 2025-05-05 RX ORDER — CHOLECALCIFEROL (VITAMIN D3) 25 MCG
2000 TABLET ORAL DAILY
Status: SHIPPED
Start: 2025-05-05

## 2025-05-05 NOTE — PROGRESS NOTES
Chief Complaint   Patient presents with    Diabetes    Hypertension    Hyperlipidemia       Jose Ramon Castro is a 75 y.o. male.     Diabetes  Associated symptoms:     no blurred vision, no chest pain, no fatigue, no polyuria and no weakness    Hypoglycemia symptoms:     no confusion    Hypertension  Associated symptoms: no blurred vision, no chest pain and no shortness of breath    Hyperlipidemia  Pertinent negatives include no chest pain, myalgias or shortness of breath.      F/U HTN.  Doing well with meds.   F/U DM2.  Doing well with insulin 14 units of tresiba and 4 units of humalog with meals.    F/U hyperlipidmeia. Doing wel with meds.  No Se.    The following portions of the patient's history were reviewed and updated as appropriate: allergies, current medications, past family history, past medical history, past social history, past surgical history and problem list.    Review of Systems   Constitutional:  Negative for appetite change and fatigue.   HENT:  Negative for nosebleeds and sore throat.    Eyes:  Negative for blurred vision and visual disturbance.   Respiratory:  Negative for shortness of breath and wheezing.    Cardiovascular:  Negative for chest pain and leg swelling.   Gastrointestinal:  Negative for abdominal distention and abdominal pain.   Endocrine: Negative for cold intolerance and polyuria.   Genitourinary:  Negative for dysuria and hematuria.   Musculoskeletal:  Negative for arthralgias and myalgias.   Skin:  Negative for color change and rash.   Neurological:  Negative for weakness and confusion.   Psychiatric/Behavioral:  Negative for agitation and depressed mood.        Patient Active Problem List   Diagnosis    Seasonal allergic rhinitis due to pollen    BPH (benign prostatic hyperplasia)    Constipation    Hypertension, essential    Depression    Diabetic peripheral neuropathy    Hyperlipidemia    Type 2 diabetes mellitus with diabetic polyneuropathy, with long-term current  "use of insulin    Medicare annual wellness visit, subsequent    CKD stage 3 secondary to diabetes    Thyroid disorder    Vitamin D deficiency    B12 deficiency    Encounter for hepatitis C screening test for low risk patient    Schizoaffective disorder    OA (osteoarthritis) of knee       Allergies   Allergen Reactions    Hydrocodone Itching and Other (See Comments)     Headaches/trembles         Current Outpatient Medications:     amLODIPine (NORVASC) 5 MG tablet, TAKE 1 TABLET BY MOUTH EVERY DAY, Disp: 90 tablet, Rfl: 1    cholecalciferol (VITAMIN D3) 25 MCG (1000 UT) tablet, Take 2 tablets by mouth Daily., Disp: , Rfl:     cyanocobalamin (VITAMIN B-12) 1000 MCG tablet, Take 1 tablet by mouth., Disp: , Rfl:     glucose blood (OneTouch Ultra) test strip, USE AS INSTRUCTED TO CHECK 3 TIMES A DAY BEFORE MEALS., Disp: 300 each, Rfl: 3    haloperidol (HALDOL) 5 MG tablet, Take 1 tablet by mouth 2 (Two) Times a Day., Disp: , Rfl: 0    hydrALAZINE (APRESOLINE) 100 MG tablet, Take 1 tablet by mouth 3 (Three) Times a Day., Disp: , Rfl:     insulin degludec (Tresiba FlexTouch) 100 UNIT/ML solution pen-injector injection, INJECT 14 UNITS SUBCUTANEOUSLY ONCE A DAY, Disp: 15 mL, Rfl: 11    Insulin Lispro, 1 Unit Dial, (HumaLOG KwikPen) 100 UNIT/ML solution pen-injector, Inject 4 Units under the skin into the appropriate area as directed 3 (Three) Times a Day Before Meals., Disp: 15 mL, Rfl: 5    Insulin Pen Needle (Pen Needles 3/16\") 31G X 5 MM misc, Inject insulin 4 times a day as directed, Disp: 200 each, Rfl: 5    latanoprost (XALATAN) 0.005 % ophthalmic solution, Administer 1 drop to both eyes every night at bedtime., Disp: , Rfl:     lisinopril (PRINIVIL,ZESTRIL) 40 MG tablet, TAKE 1 TABLET BY MOUTH EVERY DAY, Disp: 90 tablet, Rfl: 3    nortriptyline (PAMELOR) 50 MG capsule, Take 1 capsule by mouth Every Night., Disp: , Rfl:     pravastatin (PRAVACHOL) 20 MG tablet, Take 1 tablet by mouth Daily., Disp: 90 tablet, Rfl: 3   "  tamsulosin (FLOMAX) 0.4 MG capsule 24 hr capsule, Take 1 capsule by mouth Daily., Disp: 90 capsule, Rfl: 3    Past Medical History:   Diagnosis Date    Allergic rhinitis     BPH (benign prostatic hyperplasia)     Chronic kidney disease (CKD) stage G2/A1, mildly decreased glomerular filtration rate (GFR) between 60-89 mL/min/1.73 square meter and albuminuria creatinine ratio less than 30 mg/g     Constipation     Constipation, chronic     Depression     Diabetes mellitus     Diabetic peripheral neuropathy     Former smoker     High blood pressure     High cholesterol     Hyperlipidemia     Hypertension, essential     Inflamed skin tag     Medicare annual wellness visit, subsequent     Prostate disease     Rash     Seasonal allergic rhinitis due to pollen     Stage 2 chronic kidney disease     Thyroid disorder     Tinea cruris     Type 1 diabetes mellitus     Vitamin D deficiency        Past Surgical History:   Procedure Laterality Date    COLONOSCOPY  12/24/20, 11/2015    3 polyps,  2 polyps    OTHER SURGICAL HISTORY      Sialolithotomy-Abstracted from [employer]       Family History   Problem Relation Age of Onset    Hyperlipidemia Mother     Colon cancer Mother     Hypertension Mother     Anxiety disorder Mother     Heart disease Mother     Diabetes type I Father     Colon cancer Brother     Diabetes type I Brother     Heart disease Other         In females before age 65, and males before age 55       Social History     Tobacco Use    Smoking status: Former     Current packs/day: 1.00     Average packs/day: 1 pack/day for 5.0 years (5.0 ttl pk-yrs)     Types: Cigarettes     Passive exposure: Never    Smokeless tobacco: Never    Tobacco comments:     smoked less than 1 pack per day for less than 5 years   Substance Use Topics    Alcohol use: Yes     Comment: drinks alcohol, 7 or less drinks per week            Objective     Vitals:    05/05/25 0909   BP: 124/62   Pulse: 62   Resp: 17   Temp: 97.6 °F (36.4 °C)  "  SpO2: 97%     Body mass index is 27.12 kg/m².    Physical Exam  Vitals reviewed.   Constitutional:       Appearance: He is well-developed. He is not diaphoretic.   HENT:      Head: Normocephalic and atraumatic.   Eyes:      General: No scleral icterus.     Pupils: Pupils are equal, round, and reactive to light.   Neck:      Thyroid: No thyromegaly.   Cardiovascular:      Rate and Rhythm: Normal rate and regular rhythm.      Heart sounds: No murmur heard.     No friction rub. No gallop.   Pulmonary:      Effort: Pulmonary effort is normal. No respiratory distress.      Breath sounds: No wheezing or rales.   Chest:      Chest wall: No tenderness.   Abdominal:      General: Bowel sounds are normal. There is no distension.      Palpations: Abdomen is soft.      Tenderness: There is no abdominal tenderness.   Musculoskeletal:         General: No deformity. Normal range of motion.   Lymphadenopathy:      Cervical: No cervical adenopathy.   Skin:     General: Skin is warm and dry.      Findings: No rash.   Neurological:      Cranial Nerves: No cranial nerve deficit.      Motor: No abnormal muscle tone.         Lab Results   Component Value Date    GLUCOSE 103 (H) 08/29/2024    BUN 22 08/29/2024    CREATININE 1.66 (H) 08/29/2024    EGFRIFNONA 39 (L) 01/20/2022    EGFRIFAFRI 45 (L) 01/20/2022    BCR 13.3 08/29/2024    K 4.5 08/29/2024    CO2 26.8 08/29/2024    CALCIUM 8.9 08/29/2024    ALBUMIN 3.8 08/29/2024    AST 12 08/29/2024    ALT 32 08/29/2024       No results found for: \"WBC\", \"RBC\", \"HGB\", \"HCT\", \"MCV\", \"MCH\", \"MCHC\", \"RDW\", \"RDWSD\", \"MPV\", \"PLT\", \"NEUTRORELPCT\", \"LYMPHORELPCT\", \"MONORELPCT\", \"EOSRELPCT\", \"BASORELPCT\", \"AUTOIGPER\", \"NEUTROABS\", \"LYMPHSABS\", \"MONOSABS\", \"EOSABS\", \"BASOSABS\", \"AUTOIGNUM\", \"NRBC\"    Lab Results   Component Value Date    HGBA1C 5.90 (H) 12/30/2024       Lab Results   Component Value Date    DPFUHBFN86 1,332 (H) 12/15/2022       TSH   Date Value Ref Range Status   09/24/2019 2.640 0.270 " "- 4.200 uIU/mL Final       No results found for: \"CHOL\"  Lab Results   Component Value Date    TRIG 64 12/30/2024     Lab Results   Component Value Date    HDL 57 12/30/2024     Lab Results   Component Value Date    LDL 87 12/30/2024     Lab Results   Component Value Date    VLDL 13 12/30/2024     No results found for: \"LDLHDL\"      Procedures    Assessment & Plan   Problems Addressed this Visit       Hypertension, essential    Hyperlipidemia    Type 2 diabetes mellitus with diabetic polyneuropathy, with long-term current use of insulin    Vitamin D deficiency    B12 deficiency    OA (osteoarthritis) of knee - Primary     Diagnoses         Codes Comments      Primary osteoarthritis of both knees    -  Primary ICD-10-CM: M17.0  ICD-9-CM: 715.16       Type 2 diabetes mellitus with diabetic polyneuropathy, with long-term current use of insulin     ICD-10-CM: E11.42, Z79.4  ICD-9-CM: 250.60, 357.2, V58.67       Hypertension, essential     ICD-10-CM: I10  ICD-9-CM: 401.9       Mixed hyperlipidemia     ICD-10-CM: E78.2  ICD-9-CM: 272.2       B12 deficiency     ICD-10-CM: E53.8  ICD-9-CM: 266.2       Vitamin D deficiency     ICD-10-CM: E55.9  ICD-9-CM: 268.9           B knee OA>  Uncontrolled.  Start home PT.  Disabled handicap permit given.    HTN. Controlled. Contnue amlo/lisinopril/hydralazine.  DM2.  Check A1c, urine micro, CMP.  LDL at goal 12/30/24.    B12 def.  Check B12 level.  Contnue replacement.    Hyperlipidmeia.  Controlled.  Contue prava.    No orders of the defined types were placed in this encounter.      Current Outpatient Medications   Medication Sig Dispense Refill    amLODIPine (NORVASC) 5 MG tablet TAKE 1 TABLET BY MOUTH EVERY DAY 90 tablet 1    cholecalciferol (VITAMIN D3) 25 MCG (1000 UT) tablet Take 2 tablets by mouth Daily.      cyanocobalamin (VITAMIN B-12) 1000 MCG tablet Take 1 tablet by mouth.      glucose blood (OneTouch Ultra) test strip USE AS INSTRUCTED TO CHECK 3 TIMES A DAY BEFORE MEALS. " "300 each 3    haloperidol (HALDOL) 5 MG tablet Take 1 tablet by mouth 2 (Two) Times a Day.  0    hydrALAZINE (APRESOLINE) 100 MG tablet Take 1 tablet by mouth 3 (Three) Times a Day.      insulin degludec (Tresiba FlexTouch) 100 UNIT/ML solution pen-injector injection INJECT 14 UNITS SUBCUTANEOUSLY ONCE A DAY 15 mL 11    Insulin Lispro, 1 Unit Dial, (HumaLOG KwikPen) 100 UNIT/ML solution pen-injector Inject 4 Units under the skin into the appropriate area as directed 3 (Three) Times a Day Before Meals. 15 mL 5    Insulin Pen Needle (Pen Needles 3/16\") 31G X 5 MM misc Inject insulin 4 times a day as directed 200 each 5    latanoprost (XALATAN) 0.005 % ophthalmic solution Administer 1 drop to both eyes every night at bedtime.      lisinopril (PRINIVIL,ZESTRIL) 40 MG tablet TAKE 1 TABLET BY MOUTH EVERY DAY 90 tablet 3    nortriptyline (PAMELOR) 50 MG capsule Take 1 capsule by mouth Every Night.      pravastatin (PRAVACHOL) 20 MG tablet Take 1 tablet by mouth Daily. 90 tablet 3    tamsulosin (FLOMAX) 0.4 MG capsule 24 hr capsule Take 1 capsule by mouth Daily. 90 capsule 3     No current facility-administered medications for this visit.       Krishjuma Castro had no medications administered during this visit.    No follow-ups on file.    There are no Patient Instructions on file for this visit.       "

## 2025-05-06 LAB
25(OH)D3+25(OH)D2 SERPL-MCNC: 48.8 NG/ML (ref 30–100)
ALBUMIN SERPL-MCNC: 4.2 G/DL (ref 3.5–5.2)
ALBUMIN/CREAT UR: 10 MG/G CREAT (ref 0–29)
ALBUMIN/GLOB SERPL: 1.8 G/DL
ALP SERPL-CCNC: 123 U/L (ref 39–117)
ALT SERPL-CCNC: 26 U/L (ref 1–41)
AST SERPL-CCNC: 14 U/L (ref 1–40)
BILIRUB SERPL-MCNC: 0.5 MG/DL (ref 0–1.2)
BUN SERPL-MCNC: 17 MG/DL (ref 8–23)
BUN/CREAT SERPL: 11.5 (ref 7–25)
CALCIUM SERPL-MCNC: 9.3 MG/DL (ref 8.6–10.5)
CHLORIDE SERPL-SCNC: 105 MMOL/L (ref 98–107)
CO2 SERPL-SCNC: 25 MMOL/L (ref 22–29)
CREAT SERPL-MCNC: 1.48 MG/DL (ref 0.76–1.27)
CREAT UR-MCNC: 115.6 MG/DL
EGFRCR SERPLBLD CKD-EPI 2021: 48.7 ML/MIN/1.73
GLOBULIN SER CALC-MCNC: 2.3 GM/DL
GLUCOSE SERPL-MCNC: 139 MG/DL (ref 65–99)
HBA1C MFR BLD: 6.2 % (ref 4.8–5.6)
MICROALBUMIN UR-MCNC: 11.6 UG/ML
POTASSIUM SERPL-SCNC: 4.1 MMOL/L (ref 3.5–5.2)
PROT SERPL-MCNC: 6.5 G/DL (ref 6–8.5)
SODIUM SERPL-SCNC: 141 MMOL/L (ref 136–145)
T4 FREE SERPL-MCNC: 1.19 NG/DL (ref 0.93–1.7)
TSH SERPL DL<=0.005 MIU/L-ACNC: 5.21 UIU/ML (ref 0.27–4.2)
VIT B12 SERPL-MCNC: 1649 PG/ML (ref 211–946)

## 2025-06-02 DIAGNOSIS — E11.42 TYPE 2 DIABETES MELLITUS WITH DIABETIC POLYNEUROPATHY, WITH LONG-TERM CURRENT USE OF INSULIN: ICD-10-CM

## 2025-06-02 DIAGNOSIS — Z79.4 TYPE 2 DIABETES MELLITUS WITH DIABETIC POLYNEUROPATHY, WITH LONG-TERM CURRENT USE OF INSULIN: ICD-10-CM

## 2025-06-02 RX ORDER — BLOOD SUGAR DIAGNOSTIC
STRIP MISCELLANEOUS
Qty: 300 EACH | Refills: 3 | Status: SHIPPED | OUTPATIENT
Start: 2025-06-02

## 2025-08-11 DIAGNOSIS — I10 HYPERTENSION, ESSENTIAL: ICD-10-CM

## 2025-08-11 RX ORDER — AMLODIPINE BESYLATE 5 MG/1
5 TABLET ORAL DAILY
Qty: 90 TABLET | Refills: 1 | Status: SHIPPED | OUTPATIENT
Start: 2025-08-11